# Patient Record
Sex: MALE | Race: WHITE | URBAN - METROPOLITAN AREA
[De-identification: names, ages, dates, MRNs, and addresses within clinical notes are randomized per-mention and may not be internally consistent; named-entity substitution may affect disease eponyms.]

---

## 2021-01-29 ENCOUNTER — IMMUNIZATION (OUTPATIENT)
Dept: LAB | Facility: HOSPITAL | Age: 66
End: 2021-01-29
Attending: EMERGENCY MEDICINE
Payer: MEDICARE

## 2021-01-29 DIAGNOSIS — Z23 NEED FOR VACCINATION: Primary | ICD-10-CM

## 2021-01-29 PROCEDURE — 0011A SARSCOV2 VAC 100MCG/0.5ML IM: CPT

## 2021-02-26 ENCOUNTER — IMMUNIZATION (OUTPATIENT)
Dept: LAB | Facility: HOSPITAL | Age: 66
End: 2021-02-26
Attending: EMERGENCY MEDICINE
Payer: MEDICARE

## 2021-02-26 DIAGNOSIS — Z23 NEED FOR VACCINATION: Primary | ICD-10-CM

## 2021-02-26 PROCEDURE — 0012A SARSCOV2 VAC 100MCG/0.5ML IM: CPT

## 2022-11-03 NOTE — CM/SW NOTE
CM received e-mail from [address] [place] [address] office, pt pre-op dc plan is:  Return home with [facility] on dc in 1-2 days. CM requested department  [facility]) to initiate [address] referral for [facility] on admission. Plan: Home with Home Health-Agency TBD. Brittany Shipman.  Nicolle Goodman RN, BSN  Nurse   [phone]

## 2022-11-09 ENCOUNTER — LAB ENCOUNTER (OUTPATIENT)
Dept: LAB | Facility: HOSPITAL | Age: 67
End: 2022-11-09
Attending: ORTHOPAEDIC SURGERY
Payer: MEDICARE

## 2022-11-09 DIAGNOSIS — Z01.818 PREOPERATIVE TESTING: ICD-10-CM

## 2022-11-09 LAB
ANTIBODY SCREEN: NEGATIVE
RH BLOOD TYPE: NEGATIVE
SARS-COV-2 RNA RESP QL NAA+PROBE: NOT DETECTED

## 2022-11-09 PROCEDURE — 86850 RBC ANTIBODY SCREEN: CPT

## 2022-11-09 PROCEDURE — 36415 COLL VENOUS BLD VENIPUNCTURE: CPT

## 2022-11-09 PROCEDURE — 86901 BLOOD TYPING SEROLOGIC RH(D): CPT

## 2022-11-09 PROCEDURE — 86900 BLOOD TYPING SEROLOGIC ABO: CPT

## 2022-11-10 ENCOUNTER — ANESTHESIA EVENT (OUTPATIENT)
Dept: SURGERY | Facility: HOSPITAL | Age: 67
End: 2022-11-10
Payer: MEDICARE

## 2022-11-10 RX ORDER — FUROSEMIDE 40 MG/1
40 TABLET ORAL EVERY MORNING
COMMUNITY

## 2022-11-10 RX ORDER — CLONIDINE HYDROCHLORIDE 0.1 MG/1
0.5 TABLET ORAL NIGHTLY
COMMUNITY

## 2022-11-10 RX ORDER — ARIPIPRAZOLE 15 MG/1
TABLET ORAL DAILY
COMMUNITY

## 2022-11-10 RX ORDER — ACETAMINOPHEN 500 MG
500 TABLET ORAL EVERY 6 HOURS PRN
COMMUNITY

## 2022-11-10 RX ORDER — ERGOCALCIFEROL (VITAMIN D2) 1250 MCG
CAPSULE ORAL WEEKLY
COMMUNITY

## 2022-11-10 RX ORDER — TRAMADOL HYDROCHLORIDE 50 MG/1
50 TABLET ORAL EVERY 6 HOURS PRN
COMMUNITY

## 2022-11-10 RX ORDER — DOXAZOSIN 8 MG/1
8 TABLET ORAL NIGHTLY
COMMUNITY

## 2022-11-10 RX ORDER — FLECAINIDE ACETATE 100 MG/1
100 TABLET ORAL 2 TIMES DAILY
COMMUNITY

## 2022-11-10 RX ORDER — TRAZODONE HYDROCHLORIDE 50 MG/1
50 TABLET ORAL NIGHTLY
COMMUNITY

## 2022-11-10 RX ORDER — SIMVASTATIN 10 MG
10 TABLET ORAL NIGHTLY
COMMUNITY

## 2022-11-11 ENCOUNTER — HOSPITAL ENCOUNTER (INPATIENT)
Facility: HOSPITAL | Age: 67
LOS: 2 days | Discharge: HOME | End: 2022-11-15
Attending: ORTHOPAEDIC SURGERY | Admitting: ORTHOPAEDIC SURGERY
Payer: MEDICARE

## 2022-11-11 ENCOUNTER — ANESTHESIA (OUTPATIENT)
Dept: SURGERY | Facility: HOSPITAL | Age: 67
End: 2022-11-11
Payer: MEDICARE

## 2022-11-11 ENCOUNTER — APPOINTMENT (OUTPATIENT)
Dept: GENERAL RADIOLOGY | Facility: HOSPITAL | Age: 67
End: 2022-11-11
Attending: NURSE PRACTITIONER
Payer: MEDICARE

## 2022-11-11 ENCOUNTER — HOSPITAL ENCOUNTER (INPATIENT)
Facility: HOSPITAL | Age: 67
LOS: 2 days | Discharge: HOME HEALTH | End: 2022-11-15
Attending: ORTHOPAEDIC SURGERY | Admitting: ORTHOPAEDIC SURGERY
Payer: MEDICARE

## 2022-11-11 DIAGNOSIS — Z01.818 PREOPERATIVE TESTING: Primary | ICD-10-CM

## 2022-11-11 PROBLEM — I10 ESSENTIAL HYPERTENSION: Chronic | Status: ACTIVE | Noted: 2022-11-11

## 2022-11-11 PROBLEM — E78.5 HYPERLIPIDEMIA: Chronic | Status: ACTIVE | Noted: 2022-11-11

## 2022-11-11 PROBLEM — M17.12 PRIMARY OSTEOARTHRITIS OF LEFT KNEE: Status: ACTIVE | Noted: 2022-11-11

## 2022-11-11 PROBLEM — I48.0 PAROXYSMAL ATRIAL FIBRILLATION (HCC): Chronic | Status: ACTIVE | Noted: 2022-11-11

## 2022-11-11 PROBLEM — N40.0 BPH (BENIGN PROSTATIC HYPERPLASIA): Chronic | Status: ACTIVE | Noted: 2022-11-11

## 2022-11-11 LAB — RH BLOOD TYPE: NEGATIVE

## 2022-11-11 PROCEDURE — 0SRD0J9 REPLACEMENT OF LEFT KNEE JOINT WITH SYNTHETIC SUBSTITUTE, CEMENTED, OPEN APPROACH: ICD-10-PCS | Performed by: ORTHOPAEDIC SURGERY

## 2022-11-11 PROCEDURE — 73560 X-RAY EXAM OF KNEE 1 OR 2: CPT | Performed by: NURSE PRACTITIONER

## 2022-11-11 PROCEDURE — 99204 OFFICE O/P NEW MOD 45 MIN: CPT | Performed by: HOSPITALIST

## 2022-11-11 DEVICE — FEMORAL COMPONENT, CR
Type: IMPLANTABLE DEVICE | Site: KNEE | Status: FUNCTIONAL
Brand: LINKSYMPHOKNEE

## 2022-11-11 DEVICE — ARTICULATING SURFACES, CR
Type: IMPLANTABLE DEVICE | Site: KNEE | Status: FUNCTIONAL
Brand: LINKSYMPHOKNEE

## 2022-11-11 DEVICE — BIOMET BC R 1X40 US: Type: IMPLANTABLE DEVICE | Status: FUNCTIONAL

## 2022-11-11 DEVICE — TIBIAL COMPONENT, MONOBLOCK
Type: IMPLANTABLE DEVICE | Site: KNEE | Status: FUNCTIONAL
Brand: LINKSYMPHOKNEE

## 2022-11-11 DEVICE — PATELLA COMPONENT, 3-PEG
Type: IMPLANTABLE DEVICE | Site: KNEE | Status: FUNCTIONAL
Brand: LINKSYMPHOKNEE

## 2022-11-11 RX ORDER — DIPHENHYDRAMINE HCL 25 MG
25 CAPSULE ORAL EVERY 4 HOURS PRN
Status: DISCONTINUED | OUTPATIENT
Start: 2022-11-11 | End: 2022-11-12

## 2022-11-11 RX ORDER — HYDROCODONE BITARTRATE AND ACETAMINOPHEN 5; 325 MG/1; MG/1
1 TABLET ORAL EVERY 4 HOURS PRN
Status: DISCONTINUED | OUTPATIENT
Start: 2022-11-11 | End: 2022-11-15

## 2022-11-11 RX ORDER — SODIUM CHLORIDE, SODIUM LACTATE, POTASSIUM CHLORIDE, CALCIUM CHLORIDE 600; 310; 30; 20 MG/100ML; MG/100ML; MG/100ML; MG/100ML
INJECTION, SOLUTION INTRAVENOUS CONTINUOUS
Status: DISCONTINUED | OUTPATIENT
Start: 2022-11-11 | End: 2022-11-14

## 2022-11-11 RX ORDER — HYDROCODONE BITARTRATE AND ACETAMINOPHEN 7.5; 325 MG/1; MG/1
1 TABLET ORAL EVERY 6 HOURS PRN
Status: DISCONTINUED | OUTPATIENT
Start: 2022-11-11 | End: 2022-11-12

## 2022-11-11 RX ORDER — NALOXONE HYDROCHLORIDE 0.4 MG/ML
0.08 INJECTION, SOLUTION INTRAMUSCULAR; INTRAVENOUS; SUBCUTANEOUS
Status: DISCONTINUED | OUTPATIENT
Start: 2022-11-11 | End: 2022-11-12

## 2022-11-11 RX ORDER — HYDROCODONE BITARTRATE AND ACETAMINOPHEN 10; 325 MG/1; MG/1
TABLET ORAL
COMMUNITY
Start: 2022-10-25

## 2022-11-11 RX ORDER — PRAVASTATIN SODIUM 20 MG
20 TABLET ORAL NIGHTLY
Status: DISCONTINUED | OUTPATIENT
Start: 2022-11-11 | End: 2022-11-15

## 2022-11-11 RX ORDER — NALOXONE HYDROCHLORIDE 0.4 MG/ML
80 INJECTION, SOLUTION INTRAMUSCULAR; INTRAVENOUS; SUBCUTANEOUS AS NEEDED
Status: ACTIVE | OUTPATIENT
Start: 2022-11-11 | End: 2022-11-11

## 2022-11-11 RX ORDER — FUROSEMIDE 40 MG/1
40 TABLET ORAL EVERY MORNING
Status: DISCONTINUED | OUTPATIENT
Start: 2022-11-12 | End: 2022-11-15

## 2022-11-11 RX ORDER — CYCLOBENZAPRINE HCL 10 MG
10 TABLET ORAL EVERY 8 HOURS PRN
Status: DISCONTINUED | OUTPATIENT
Start: 2022-11-11 | End: 2022-11-15

## 2022-11-11 RX ORDER — HYDROCODONE BITARTRATE AND ACETAMINOPHEN 7.5; 325 MG/1; MG/1
2 TABLET ORAL EVERY 6 HOURS PRN
Status: DISCONTINUED | OUTPATIENT
Start: 2022-11-11 | End: 2022-11-12

## 2022-11-11 RX ORDER — BISACODYL 10 MG
10 SUPPOSITORY, RECTAL RECTAL
Status: DISCONTINUED | OUTPATIENT
Start: 2022-11-11 | End: 2022-11-15

## 2022-11-11 RX ORDER — METOCLOPRAMIDE HYDROCHLORIDE 5 MG/ML
10 INJECTION INTRAMUSCULAR; INTRAVENOUS EVERY 8 HOURS PRN
Status: DISCONTINUED | OUTPATIENT
Start: 2022-11-11 | End: 2022-11-14 | Stop reason: ALTCHOICE

## 2022-11-11 RX ORDER — BUPIVACAINE HYDROCHLORIDE 7.5 MG/ML
INJECTION, SOLUTION INTRASPINAL
Status: COMPLETED | OUTPATIENT
Start: 2022-11-11 | End: 2022-11-11

## 2022-11-11 RX ORDER — FAMOTIDINE 20 MG/1
20 TABLET, FILM COATED ORAL ONCE
Status: COMPLETED | OUTPATIENT
Start: 2022-11-11 | End: 2022-11-11

## 2022-11-11 RX ORDER — ARIPIPRAZOLE 15 MG/1
40 TABLET ORAL DAILY
Status: DISCONTINUED | OUTPATIENT
Start: 2022-11-12 | End: 2022-11-14 | Stop reason: ALTCHOICE

## 2022-11-11 RX ORDER — HALOPERIDOL 5 MG/ML
0.5 INJECTION INTRAMUSCULAR ONCE AS NEEDED
Status: ACTIVE | OUTPATIENT
Start: 2022-11-11 | End: 2022-11-11

## 2022-11-11 RX ORDER — DIPHENHYDRAMINE HYDROCHLORIDE 50 MG/ML
25 INJECTION INTRAMUSCULAR; INTRAVENOUS ONCE AS NEEDED
Status: ACTIVE | OUTPATIENT
Start: 2022-11-11 | End: 2022-11-11

## 2022-11-11 RX ORDER — HYDROMORPHONE HYDROCHLORIDE 1 MG/ML
0.5 INJECTION, SOLUTION INTRAMUSCULAR; INTRAVENOUS; SUBCUTANEOUS EVERY 2 HOUR PRN
Status: DISCONTINUED | OUTPATIENT
Start: 2022-11-11 | End: 2022-11-15

## 2022-11-11 RX ORDER — CEFAZOLIN SODIUM/WATER 2 G/20 ML
2 SYRINGE (ML) INTRAVENOUS EVERY 8 HOURS
Status: COMPLETED | OUTPATIENT
Start: 2022-11-11 | End: 2022-11-12

## 2022-11-11 RX ORDER — HYDROMORPHONE HYDROCHLORIDE 1 MG/ML
0.4 INJECTION, SOLUTION INTRAMUSCULAR; INTRAVENOUS; SUBCUTANEOUS EVERY 5 MIN PRN
Status: DISCONTINUED | OUTPATIENT
Start: 2022-11-11 | End: 2022-11-14

## 2022-11-11 RX ORDER — POLYETHYLENE GLYCOL 3350 17 G/17G
17 POWDER, FOR SOLUTION ORAL DAILY PRN
Status: DISCONTINUED | OUTPATIENT
Start: 2022-11-11 | End: 2022-11-15

## 2022-11-11 RX ORDER — SENNOSIDES 8.6 MG
17.2 TABLET ORAL NIGHTLY
Status: DISCONTINUED | OUTPATIENT
Start: 2022-11-11 | End: 2022-11-15

## 2022-11-11 RX ORDER — MORPHINE SULFATE 4 MG/ML
4 INJECTION, SOLUTION INTRAMUSCULAR; INTRAVENOUS EVERY 10 MIN PRN
Status: DISCONTINUED | OUTPATIENT
Start: 2022-11-11 | End: 2022-11-14

## 2022-11-11 RX ORDER — DIPHENHYDRAMINE HYDROCHLORIDE 50 MG/ML
12.5 INJECTION INTRAMUSCULAR; INTRAVENOUS EVERY 4 HOURS PRN
Status: DISCONTINUED | OUTPATIENT
Start: 2022-11-11 | End: 2022-11-12

## 2022-11-11 RX ORDER — BUPIVACAINE HYDROCHLORIDE 2.5 MG/ML
INJECTION, SOLUTION EPIDURAL; INFILTRATION; INTRACAUDAL AS NEEDED
Status: DISCONTINUED | OUTPATIENT
Start: 2022-11-11 | End: 2022-11-11 | Stop reason: HOSPADM

## 2022-11-11 RX ORDER — MORPHINE SULFATE 10 MG/ML
6 INJECTION, SOLUTION INTRAMUSCULAR; INTRAVENOUS EVERY 10 MIN PRN
Status: DISCONTINUED | OUTPATIENT
Start: 2022-11-11 | End: 2022-11-14

## 2022-11-11 RX ORDER — MORPHINE SULFATE 4 MG/ML
2 INJECTION, SOLUTION INTRAMUSCULAR; INTRAVENOUS EVERY 10 MIN PRN
Status: DISCONTINUED | OUTPATIENT
Start: 2022-11-11 | End: 2022-11-14

## 2022-11-11 RX ORDER — CEFAZOLIN SODIUM/WATER 2 G/20 ML
2 SYRINGE (ML) INTRAVENOUS ONCE
Status: COMPLETED | OUTPATIENT
Start: 2022-11-11 | End: 2022-11-11

## 2022-11-11 RX ORDER — FAMOTIDINE 10 MG/ML
20 INJECTION, SOLUTION INTRAVENOUS 2 TIMES DAILY
Status: DISCONTINUED | OUTPATIENT
Start: 2022-11-11 | End: 2022-11-15

## 2022-11-11 RX ORDER — ACETAMINOPHEN 325 MG/1
650 TABLET ORAL EVERY 6 HOURS PRN
Status: DISCONTINUED | OUTPATIENT
Start: 2022-11-11 | End: 2022-11-12

## 2022-11-11 RX ORDER — DIPHENHYDRAMINE HCL 25 MG
25 CAPSULE ORAL EVERY 4 HOURS PRN
Status: DISCONTINUED | OUTPATIENT
Start: 2022-11-11 | End: 2022-11-15

## 2022-11-11 RX ORDER — TRAZODONE HYDROCHLORIDE 50 MG/1
50 TABLET ORAL NIGHTLY
Status: DISCONTINUED | OUTPATIENT
Start: 2022-11-11 | End: 2022-11-15

## 2022-11-11 RX ORDER — ONDANSETRON 2 MG/ML
4 INJECTION INTRAMUSCULAR; INTRAVENOUS ONCE AS NEEDED
Status: ACTIVE | OUTPATIENT
Start: 2022-11-11 | End: 2022-11-11

## 2022-11-11 RX ORDER — METOCLOPRAMIDE HYDROCHLORIDE 5 MG/ML
10 INJECTION INTRAMUSCULAR; INTRAVENOUS EVERY 8 HOURS PRN
Status: DISCONTINUED | OUTPATIENT
Start: 2022-11-11 | End: 2022-11-15

## 2022-11-11 RX ORDER — DIPHENHYDRAMINE HYDROCHLORIDE 50 MG/ML
12.5 INJECTION INTRAMUSCULAR; INTRAVENOUS EVERY 4 HOURS PRN
Status: DISCONTINUED | OUTPATIENT
Start: 2022-11-11 | End: 2022-11-15

## 2022-11-11 RX ORDER — SODIUM PHOSPHATE, DIBASIC AND SODIUM PHOSPHATE, MONOBASIC 7; 19 G/133ML; G/133ML
1 ENEMA RECTAL ONCE AS NEEDED
Status: DISCONTINUED | OUTPATIENT
Start: 2022-11-11 | End: 2022-11-15

## 2022-11-11 RX ORDER — NALBUPHINE HCL 10 MG/ML
2.5 AMPUL (ML) INJECTION EVERY 4 HOURS PRN
Status: DISCONTINUED | OUTPATIENT
Start: 2022-11-11 | End: 2022-11-12

## 2022-11-11 RX ORDER — PROCHLORPERAZINE EDISYLATE 5 MG/ML
5 INJECTION INTRAMUSCULAR; INTRAVENOUS ONCE AS NEEDED
Status: ACTIVE | OUTPATIENT
Start: 2022-11-11 | End: 2022-11-11

## 2022-11-11 RX ORDER — OXYCODONE HCL 10 MG/1
10 TABLET, FILM COATED, EXTENDED RELEASE ORAL ONCE
Status: COMPLETED | OUTPATIENT
Start: 2022-11-11 | End: 2022-11-11

## 2022-11-11 RX ORDER — TERAZOSIN 10 MG/1
10 CAPSULE ORAL NIGHTLY
Status: DISCONTINUED | OUTPATIENT
Start: 2022-11-11 | End: 2022-11-15

## 2022-11-11 RX ORDER — HYDROCODONE BITARTRATE AND ACETAMINOPHEN 10; 325 MG/1; MG/1
2 TABLET ORAL EVERY 4 HOURS PRN
Status: DISCONTINUED | OUTPATIENT
Start: 2022-11-11 | End: 2022-11-15

## 2022-11-11 RX ORDER — METOPROLOL TARTRATE 5 MG/5ML
2.5 INJECTION INTRAVENOUS ONCE
Status: DISCONTINUED | OUTPATIENT
Start: 2022-11-11 | End: 2022-11-11

## 2022-11-11 RX ORDER — HYDROMORPHONE HYDROCHLORIDE 1 MG/ML
0.4 INJECTION, SOLUTION INTRAMUSCULAR; INTRAVENOUS; SUBCUTANEOUS
Status: DISCONTINUED | OUTPATIENT
Start: 2022-11-11 | End: 2022-11-12

## 2022-11-11 RX ORDER — ONDANSETRON 2 MG/ML
4 INJECTION INTRAMUSCULAR; INTRAVENOUS EVERY 6 HOURS PRN
Status: DISCONTINUED | OUTPATIENT
Start: 2022-11-11 | End: 2022-11-14 | Stop reason: ALTCHOICE

## 2022-11-11 RX ORDER — TRAMADOL HYDROCHLORIDE 50 MG/1
50 TABLET ORAL EVERY 6 HOURS PRN
Status: DISCONTINUED | OUTPATIENT
Start: 2022-11-11 | End: 2022-11-15

## 2022-11-11 RX ORDER — FAMOTIDINE 20 MG/1
20 TABLET, FILM COATED ORAL 2 TIMES DAILY
Status: DISCONTINUED | OUTPATIENT
Start: 2022-11-11 | End: 2022-11-15

## 2022-11-11 RX ORDER — ACETAMINOPHEN 500 MG
1000 TABLET ORAL ONCE
Status: COMPLETED | OUTPATIENT
Start: 2022-11-11 | End: 2022-11-11

## 2022-11-11 RX ORDER — TRANEXAMIC ACID 650 MG/1
1300 TABLET ORAL ONCE
Status: COMPLETED | OUTPATIENT
Start: 2022-11-11 | End: 2022-11-11

## 2022-11-11 RX ORDER — ACETAMINOPHEN 500 MG
1000 TABLET ORAL EVERY 8 HOURS PRN
Status: DISCONTINUED | OUTPATIENT
Start: 2022-11-11 | End: 2022-11-15

## 2022-11-11 RX ORDER — HYDROMORPHONE HYDROCHLORIDE 1 MG/ML
0.6 INJECTION, SOLUTION INTRAMUSCULAR; INTRAVENOUS; SUBCUTANEOUS
Status: DISCONTINUED | OUTPATIENT
Start: 2022-11-11 | End: 2022-11-12

## 2022-11-11 RX ORDER — HYDROMORPHONE HYDROCHLORIDE 1 MG/ML
0.2 INJECTION, SOLUTION INTRAMUSCULAR; INTRAVENOUS; SUBCUTANEOUS EVERY 5 MIN PRN
Status: DISCONTINUED | OUTPATIENT
Start: 2022-11-11 | End: 2022-11-14

## 2022-11-11 RX ORDER — ONDANSETRON 2 MG/ML
4 INJECTION INTRAMUSCULAR; INTRAVENOUS EVERY 6 HOURS PRN
Status: DISCONTINUED | OUTPATIENT
Start: 2022-11-11 | End: 2022-11-15

## 2022-11-11 RX ORDER — FLECAINIDE ACETATE 100 MG/1
100 TABLET ORAL 2 TIMES DAILY
Status: DISCONTINUED | OUTPATIENT
Start: 2022-11-11 | End: 2022-11-15

## 2022-11-11 RX ORDER — MORPHINE SULFATE 1 MG/ML
INJECTION, SOLUTION EPIDURAL; INTRATHECAL; INTRAVENOUS
Status: COMPLETED | OUTPATIENT
Start: 2022-11-11 | End: 2022-11-11

## 2022-11-11 RX ORDER — CELECOXIB 200 MG/1
200 CAPSULE ORAL ONCE
Status: COMPLETED | OUTPATIENT
Start: 2022-11-11 | End: 2022-11-11

## 2022-11-11 RX ORDER — DOCUSATE SODIUM 100 MG/1
100 CAPSULE, LIQUID FILLED ORAL 2 TIMES DAILY
Status: DISCONTINUED | OUTPATIENT
Start: 2022-11-11 | End: 2022-11-15

## 2022-11-11 RX ORDER — METOCLOPRAMIDE 10 MG/1
10 TABLET ORAL ONCE
Status: DISCONTINUED | OUTPATIENT
Start: 2022-11-11 | End: 2022-11-11 | Stop reason: HOSPADM

## 2022-11-11 RX ORDER — HYDROMORPHONE HYDROCHLORIDE 1 MG/ML
0.6 INJECTION, SOLUTION INTRAMUSCULAR; INTRAVENOUS; SUBCUTANEOUS EVERY 5 MIN PRN
Status: DISCONTINUED | OUTPATIENT
Start: 2022-11-11 | End: 2022-11-14

## 2022-11-11 RX ORDER — KETOROLAC TROMETHAMINE 15 MG/ML
15 INJECTION, SOLUTION INTRAMUSCULAR; INTRAVENOUS ONCE
Status: COMPLETED | OUTPATIENT
Start: 2022-11-11 | End: 2022-11-11

## 2022-11-11 RX ORDER — HYDROCODONE BITARTRATE AND ACETAMINOPHEN 10; 325 MG/1; MG/1
1 TABLET ORAL EVERY 4 HOURS PRN
Status: DISCONTINUED | OUTPATIENT
Start: 2022-11-11 | End: 2022-11-15

## 2022-11-11 RX ADMIN — SODIUM CHLORIDE, SODIUM LACTATE, POTASSIUM CHLORIDE, CALCIUM CHLORIDE: 600; 310; 30; 20 INJECTION, SOLUTION INTRAVENOUS at 09:32:00

## 2022-11-11 RX ADMIN — BUPIVACAINE HYDROCHLORIDE 1.6 ML: 7.5 INJECTION, SOLUTION INTRASPINAL at 08:15:00

## 2022-11-11 RX ADMIN — SODIUM CHLORIDE, SODIUM LACTATE, POTASSIUM CHLORIDE, CALCIUM CHLORIDE: 600; 310; 30; 20 INJECTION, SOLUTION INTRAVENOUS at 10:06:00

## 2022-11-11 RX ADMIN — MORPHINE SULFATE 0.3 MG: 1 INJECTION, SOLUTION EPIDURAL; INTRATHECAL; INTRAVENOUS at 08:15:00

## 2022-11-11 RX ADMIN — SODIUM CHLORIDE, SODIUM LACTATE, POTASSIUM CHLORIDE, CALCIUM CHLORIDE: 600; 310; 30; 20 INJECTION, SOLUTION INTRAVENOUS at 08:11:00

## 2022-11-11 RX ADMIN — CEFAZOLIN SODIUM/WATER 2 G: 2 G/20 ML SYRINGE (ML) INTRAVENOUS at 08:24:00

## 2022-11-11 NOTE — CM/SW NOTE
11/11/22 1300   CM/SW Referral Data   Referral Source Physician   Reason for Referral Discharge planning   Informant Patient;Spouse/Significant Other   Pertinent Medical Hx   Does patient have an established PCP? No   Significant Past Medical/Mental Health Hx Lt total knee- [place]   Patient Info   Patient's Current Mental Status at Time of Assessment Alert;Oriented   Number of Levels in Home 2   Number of Stair in Home 7 in   Patient lives with Spouse/Significant other   Patient Status Prior to Admission   Independent with ADLs and Mobility Yes   Discharge Needs   Anticipated D/C needs Home health care   Choice of Post-Acute Provider   Informed patient of right to choose their preferred provider Yes   List of appropriate post-acute services provided to patient/family with quality data Yes   Patient/family choice [facility]     Cm received MDO for dc planning post surgery. Met with pt and spouse at bedside. Discussed pre-op dc plan for home with [facility]. Pt agrees to this plan. [facility] choice list provided to pt. Pt has chosen [facility]. F2F entered. Plan: Home with [facility] when stable. Pedro. Carlos A Martinez 82 [phone]    / to remain available for support and/or discharge planning. Beverley Grant.  Bayron Gibbs RN, BSN  Nurse   [phone]

## 2022-11-11 NOTE — CM/SW NOTE
Department  notified of request for [facility] referrals started. Assigned CM/SW to follow up with pt/family on further discharge planning.      Eliel Guerra   November 11, 2022   11:13

## 2022-11-11 NOTE — ANESTHESIA PROCEDURE NOTES
Spinal Block    Date/Time: 11/11/2022 8:15 AM  Performed by: Azul Atwood MD  Authorized by: Azul Atwood MD       General Information and Staff    Start Time:  11/11/2022 8:15 AM  End Time:  11/11/2022 8:23 AM  Anesthesiologist:  Azul Atwood MD  Performed by:   Anesthesiologist  Patient Location:  OR  Site identification: surface landmarks    Preanesthetic Checklist: patient identified, IV checked, risks and benefits discussed, monitors and equipment checked, pre-op evaluation, timeout performed, anesthesia consent and sterile technique used      Procedure Details    Patient Position:  Sitting  Prep: ChloraPrep    Monitoring:  Cardiac monitor  Approach:  Midline  Location:  L2-3  Injection Technique:  Single-shot    Needle    Needle Type:  Pencil-tip  Needle Gauge:  24 G  Needle Length:  3.5 in    Assessment    Sensory Level:  T10  Events: clear CSF, CSF aspirated, well tolerated and blood negative      Additional Comments

## 2022-11-11 NOTE — OPERATIVE REPORT
[facility]    TKA Brief Operative Note    Clemencia Gowers. Patient Status:  Outpatient in a Bed    1955 MRN [id number]   Location [facility] Attending Brendan Healy MD     PCP No primary care provider on file. Preop DX: left knee degenerative joint disease   Postop DX: left knee degenerative joint disease  Procedure:  left total knee arthroplasty  Surgeon: Ana Maria Cleary MD  Assistants:  Rigoberto Pruett; Nikolay Kwan  Anesthesia: Spinal Anesthesia  EBL: 50 mL  Tourniquet Time: 75 minutes  Fluids: 1000 mL  Specimen: Bone left knee  Findings: DJD  left knee  Drain: None  Implants: Link LSK   Complications: none  All needle and sponge counts correct prior to leaving the operating room. All assistants were a medical necessity to complete this procedure. Plan: Transfer to recovery room in stable condition. Transition to floor when stable. I was present and scrubbed for the entire procedure.     Suly Garcia MD  [phone] (c)  [phone] (o)  2022

## 2022-11-11 NOTE — PLAN OF CARE
Pt A&O x4. General diet. Pain medication as needed. RA. SCDS. Quiroz. Walking 1 with a walker. Patient has remained free from falls throughout the day. Hourly rounding maintained. Pt's bed in lowest position w/ side rails up. Patient has been educated and is compliant with jeremi light system. The patient has been frequently assessed  and evaluated pursuant to at risk medications. Pt's room tidy and clear from potential fall risk.     Problem: Patient Centered Care  Goal: Patient preferences are identified and integrated in the patient's plan of care  Description: Interventions:  - What would you like us to know as we care for you?   - Provide timely, complete, and accurate information to patient/family  - Incorporate patient and family knowledge, values, beliefs, and cultural backgrounds into the planning and delivery of care  - Encourage patient/family to participate in care and decision-making at the level they choose  - Honor patient and family perspectives and choices  Outcome: Progressing     Problem: Patient/Family Goals  Goal: Patient/Family Long Term Goal  Description: Patient's Long Term Goal:     Interventions:  -   - See additional Care Plan goals for specific interventions  Outcome: Progressing  Goal: Patient/Family Short Term Goal  Description: Patient's Short Term Goal:     Interventions:   -   - See additional Care Plan goals for specific interventions  Outcome: Progressing     Problem: PAIN - ADULT  Goal: Verbalizes/displays adequate comfort level or patient's stated pain goal  Description: INTERVENTIONS:  - Encourage pt to monitor pain and request assistance  - Assess pain using appropriate pain scale  - Administer analgesics based on type and severity of pain and evaluate response  - Implement non-pharmacological measures as appropriate and evaluate response  - Consider cultural and social influences on pain and pain management  - Manage/alleviate anxiety  - Utilize distraction and/or relaxation techniques  - Monitor for opioid side effects  - Notify MD/LIP if interventions unsuccessful or patient reports new pain  - Anticipate increased pain with activity and pre-medicate as appropriate  Outcome: Progressing     Problem: RISK FOR INFECTION - ADULT  Goal: Absence of fever/infection during anticipated neutropenic period  Description: INTERVENTIONS  - Monitor WBC  - Administer growth factors as ordered  - Implement neutropenic guidelines  Outcome: Progressing     Problem: SAFETY ADULT - FALL  Goal: Free from fall injury  Description: INTERVENTIONS:  - Assess pt frequently for physical needs  - Identify cognitive and physical deficits and behaviors that affect risk of falls.   - [place] fall precautions as indicated by assessment.  - Educate pt/family on patient safety including physical limitations  - Instruct pt to call for assistance with activity based on assessment  - Modify environment to reduce risk of injury  - Provide assistive devices as appropriate  - Consider OT/PT consult to assist with strengthening/mobility  - Encourage toileting schedule  Outcome: Progressing

## 2022-11-12 LAB
ANION GAP SERPL CALC-SCNC: 6 MMOL/L (ref 0–18)
BUN BLD-MCNC: 25 MG/DL (ref 7–18)
BUN/CREAT SERPL: 25.3 (ref 10–20)
CALCIUM BLD-MCNC: 8.1 MG/DL (ref 8.5–10.1)
CHLORIDE SERPL-SCNC: 107 MMOL/L (ref 98–112)
CO2 SERPL-SCNC: 29 MMOL/L (ref 21–32)
CREAT BLD-MCNC: 0.99 MG/DL
DEPRECATED RDW RBC AUTO: 41.8 FL (ref 35.1–46.3)
ERYTHROCYTE [DISTWIDTH] IN BLOOD BY AUTOMATED COUNT: 12.1 % (ref 11–15)
GFR SERPLBLD BASED ON 1.73 SQ M-ARVRAT: 84 ML/MIN/1.73M2 (ref 60–?)
GLUCOSE BLD-MCNC: 116 MG/DL (ref 70–99)
HCT VFR BLD AUTO: 37.7 %
HGB BLD-MCNC: 11.9 G/DL
MCH RBC QN AUTO: 29.7 PG (ref 26–34)
MCHC RBC AUTO-ENTMCNC: 31.6 G/DL (ref 31–37)
MCV RBC AUTO: 94 FL
OSMOLALITY SERPL CALC.SUM OF ELEC: 299 MOSM/KG (ref 275–295)
PLATELET # BLD AUTO: 146 10(3)UL (ref 150–450)
POTASSIUM SERPL-SCNC: 4.2 MMOL/L (ref 3.5–5.1)
RBC # BLD AUTO: 4.01 X10(6)UL
SODIUM SERPL-SCNC: 142 MMOL/L (ref 136–145)
WBC # BLD AUTO: 8.8 X10(3) UL (ref 4–11)

## 2022-11-12 PROCEDURE — 99233 SBSQ HOSP IP/OBS HIGH 50: CPT | Performed by: HOSPITALIST

## 2022-11-12 NOTE — PHYSICAL THERAPY NOTE
Patient was see BID today. second session forcus on gait training including stairs navigation. Patient amb with inc steady gait, improved pattern with cues d/t limited heel strike and lt knee ext in standing face, 100 ft x 2 CGA for balance. 4 stairs navigate with 2 HR with min A and 4 stairs with 1 HR and SC with Mod A with cues for safety. Patient perspired with slightly SOB, no c/o. Patient return to room via wheelchair. Pt's male friend present to take patient home and assist on stairs to get to pt house. Patient wants to go home today. Instructed on safety with mobility with break between. RW was issued and instruction on safety with mobility given. RN aware.

## 2022-11-12 NOTE — DISCHARGE INSTRUCTIONS
The patient will call for an appointment in the next 2 weeks for follow-up. The patient has been instructed on wound care and may shower if wound is clean and dry. If the wound has drainage then dry dressing changes should be performed daily until the wound is dry. The patient has been instructed to contact the office if increasing drainage, redness, or swelling to the operative wound/extremity occurs. Similarly, if they develop fevers and chills they should call the office ASAP. The patient will continue to wear ADRIANNA hose to both legs for 3 weeks. They may remove them at night or if they are causing skin irritation. Prescribed DVT prophylaxis should be taken for 2-3 weeks after discharge (as written on prescription). Oral pain medications have been provided and instruction on administration given to the patient. If there are any questions or increasing or uncontrolled pain, the patient should call the office [phone]. The patient will resume a normal diet. They should anticipate a slight decrease in appetite after surgery, but should notify the office if there are any problems with nausea, vomiting, constipation or diarrhea. A stool softner has been ordered and should be taken regularly while on pain medications. Sometimes managing your health at home requires assistance. The [facility] team has recognized your preference to use [facility]. They can be reached by phone at [phone]. The fax number for your reference is [phone]. A representative from the home health agency will contact you or your family to schedule your first visit.    raj

## 2022-11-12 NOTE — PLAN OF CARE
Patient unable to void and bladder scan >500, straight cath per MD order and plan is staying overnight. Post-op day #1 L TKA. Dressing in place to L knee, changed by service this morning, became saturated after working with therapy, service aware and dressing changed, ok to change as needed. Was hypotensive this morning, IV fluids restarted and pressures have improved. Receiving IV fluids per MD order. Tolerating diet. Has only voided once since baxter removal, IV bolus infusing now per MD order. SCDs and eliquis for DVT prophylaxis. Norco Pain medication provided as needed. Up with standby assist and a walker. Encouraged frequent ambulation and use of incentive spirometer. Fall precautions maintained- bed alarm on, bed locked in lowest position, call light and personal belongings within reach, non-skid socks in place to bilateral feet. Frequent rounding by nursing staff. Plan is home today if patient able to void, otherwise home tomorrow.      Problem: Patient Centered Care  Goal: Patient preferences are identified and integrated in the patient's plan of care  Description: Interventions:  - What would you like us to know as we care for you?   - Provide timely, complete, and accurate information to patient/family  - Incorporate patient and family knowledge, values, beliefs, and cultural backgrounds into the planning and delivery of care  - Encourage patient/family to participate in care and decision-making at the level they choose  - Honor patient and family perspectives and choices  Outcome: Progressing     Problem: PAIN - ADULT  Goal: Verbalizes/displays adequate comfort level or patient's stated pain goal  Description: INTERVENTIONS:  - Encourage pt to monitor pain and request assistance  - Assess pain using appropriate pain scale  - Administer analgesics based on type and severity of pain and evaluate response  - Implement non-pharmacological measures as appropriate and evaluate response  - Consider cultural and social influences on pain and pain management  - Manage/alleviate anxiety  - Utilize distraction and/or relaxation techniques  - Monitor for opioid side effects  - Notify MD/LIP if interventions unsuccessful or patient reports new pain  - Anticipate increased pain with activity and pre-medicate as appropriate  Outcome: Progressing     Problem: RISK FOR INFECTION - ADULT  Goal: Absence of fever/infection during anticipated neutropenic period  Description: INTERVENTIONS  - Monitor WBC  - Administer growth factors as ordered  - Implement neutropenic guidelines  Outcome: Progressing     Problem: SAFETY ADULT - FALL  Goal: Free from fall injury  Description: INTERVENTIONS:  - Assess pt frequently for physical needs  - Identify cognitive and physical deficits and behaviors that affect risk of falls.   - [place] fall precautions as indicated by assessment.  - Educate pt/family on patient safety including physical limitations  - Instruct pt to call for assistance with activity based on assessment  - Modify environment to reduce risk of injury  - Provide assistive devices as appropriate  - Consider OT/PT consult to assist with strengthening/mobility  - Encourage toileting schedule  Outcome: Progressing     Problem: DISCHARGE PLANNING  Goal: Discharge to home or other facility with appropriate resources  Description: INTERVENTIONS:  - Identify barriers to discharge w/pt and caregiver  - Include patient/family/discharge partner in discharge planning  - Arrange for needed discharge resources and transportation as appropriate  - Identify discharge learning needs (meds, wound care, etc)  - Arrange for interpreters to assist at discharge as needed  - Consider post-discharge preferences of patient/family/discharge partner  - Complete POLST form as appropriate  - Assess patient's ability to be responsible for managing their own health  - Refer to Case Management Department for coordinating discharge planning if the patient needs post-hospital services based on physician/LIP order or complex needs related to functional status, cognitive ability or social support system  Outcome: Progressing

## 2022-11-12 NOTE — PHYSICAL THERAPY NOTE
PHYSICAL THERAPY TREATMENT NOTE - INPATIENT     Room Number: 406/406-A       Presenting Problem: primary osteoarthritis of left knee; s/p L TKA    Problem List  Principal Problem:    Primary osteoarthritis of left knee  Active Problems:    Paroxysmal atrial fibrillation (HCC)    Essential hypertension    Hyperlipidemia    BPH (benign prostatic hyperplasia)      PHYSICAL THERAPY ASSESSMENT   Chart reviewed. RN Misbah Adams approved participation in physical therapy. PPE worn by therapist: mask and gloves. Patient was wearing a mask during session. Patient presented in bed with 4/10 pain. Patient with good  progress towards goals during this session. Education provided on Total knee exercise protocol, Physical therapy plan of care, physiological benefits of out of bed mobility and proepr breathging at rest and with activity d/t dec sat noted on RW. Jose C Reveal Patient with good carryover. Patient HR running from 40' to 150' during session and RN aware of it. Patient has history of A-fib. Patient limited with his mobility today d/t HR lower BP this morning. Bed mobility: Contact guard assist  Transfers: Contact guard assist  Gait Assistance: Contact guard assist  Distance (ft): 60  Assistive Device: Rolling walker  Pattern: L Decreased stance time; Shuffle      Patient was left in bedside chair at end of session with all needs in reach. The patient's Approx Degree of Impairment: 46.58% has been calculated based on documentation in the [facility] '6 clicks' Inpatient Basic Mobility Short Form. Research supports that patients with this level of impairment may benefit from Home with home health PT. RN aware of patient status post session. DISCHARGE RECOMMENDATIONS  PT Discharge Recommendations: Home with home health PT;24 hour care/supervision     PLAN  PT Treatment Plan: Patient education; Coordination; Body mechanics;Gait training;Stair training;Transfer training    SUBJECTIVE  I feel better with my pain today. OBJECTIVE  Precautions: None    WEIGHT BEARING RESTRICTION  Weight Bearing Restriction: L lower extremity           L Lower Extremity: Weight Bearing as Tolerated    PAIN ASSESSMENT   Rating: 3  Location: left knee  Management Techniques: Activity promotion; Body mechanics;Repositioning    BALANCE                                                                                                                       Static Sitting: Good  Dynamic Sitting: Good           Static Standing: Fair  Dynamic Standing: Fair    ACTIVITY TOLERANCE  Pulse: 78  Heart Rate Source: Monitor  Resp: 20  BP: (!) 89/61  BP Location: Right arm  BP Method: Automatic  Patient Position: Sitting    O2 WALK  Oxygen Therapy  SPO2% on Room Air at Rest: 91  SPO2% Ambulation on Room Air: 96    AM-PAC '6-Clicks' INPATIENT SHORT FORM - BASIC MOBILITY  How much difficulty does the patient currently have. .. Patient Difficulty: Turning over in bed (including adjusting bedclothes, sheets and blankets)?: A Little   Patient Difficulty: Sitting down on and standing up from a chair with arms (e.g., wheelchair, bedside commode, etc.): A Little   Patient Difficulty: Moving from lying on back to sitting on the side of the bed?: A Little   How much help from another person does the patient currently need. .. Help from Another: Moving to and from a bed to a chair (including a wheelchair)?: A Little   Help from Another: Need to walk in hospital room?: A Little   Help from Another: Climbing 3-5 steps with a railing?: A Little     AM-PAC Score:  Raw Score: 18   Approx Degree of Impairment: 46.58%   Standardized Score (AM-PAC Scale): 43.63   CMS Modifier (G-Code): CK      Additional information:     THERAPEUTIC EXERCISES  Lower Extremity Alternating marching  Ankle pumps  Glut sets  Heel slides  Knee extension  Leg slides  Quad sets  SLR     Position Sitting and Supine       Patient End of Session: Up in chair; All patient questions and concerns addressed;Call light within reach;RN aware of session/findings; Needs met    CURRENT GOALS     Goals to be met by: 11/18/22  Patient Goal Patient's self-stated goal is: go home   Goal #1 Patient is able to demonstrate supine - sit EOB @ level: modified independent     Goal #1   Current Status CGA   Goal #2 Patient is able to demonstrate transfers Sit to/from Stand at assistance level: modified independent     Goal #2  Current Status CGA   Goal #3 Patient is able to ambulate 300 feet with assistive device at assistance level: modified independent    Goal #3   Current Status 60 ft RW Min A/CGA   Goal #4 Patient will negotiate 15 stairs/one curb w/ assistive device and supervision   Goal #4   Current Status NT   Goal #5  AROM 0 degrees extension to 95 degrees flexion L knee    Goal #5   Current Status In progress   Goal #6 Patient independently performs home exercise program for ROM/strengthening per the instructions provided in preparation for discharge.    Goal #6  Current Status In progress

## 2022-11-12 NOTE — PLAN OF CARE
Post-op day #1. Dressing in place to left knee. Up with one assist using walker. Vital signs monitored closely. Patient 87% on RA and applied O2 at 3L/min per nasal cannula. Able to tolerate at O2 at 2L/min and SPO2 at 95%. Zofran given for mild nausea with relief. Snack given and tolerated. Quiroz catheter removed per protocol. SCDs and Eliquis for DVT prophylaxis. Norco and tramadol given for pain as needed. Encouraged frequent use of incentive spirometer and patient agree with plan. Fall precautions maintained. Call light within reach. Frequent rounding by nursing staff. Plan to participate in OT/PT and discharge to home once medically cleared. Problem: Patient Centered Care  Goal: Patient preferences are identified and integrated in the patient's plan of care  Description: Interventions:  - What would you like us to know as we care for you?  I take tramadol at home but not that often  - Provide timely, complete, and accurate information to patient/family  - Incorporate patient and family knowledge, values, beliefs, and cultural backgrounds into the planning and delivery of care  - Encourage patient/family to participate in care and decision-making at the level they choose  - Honor patient and family perspectives and choices  Outcome: Progressing     Problem: PAIN - ADULT  Goal: Verbalizes/displays adequate comfort level or patient's stated pain goal  Description: INTERVENTIONS:  - Encourage pt to monitor pain and request assistance  - Assess pain using appropriate pain scale  - Administer analgesics based on type and severity of pain and evaluate response  - Implement non-pharmacological measures as appropriate and evaluate response  - Consider cultural and social influences on pain and pain management  - Manage/alleviate anxiety  - Utilize distraction and/or relaxation techniques  - Monitor for opioid side effects  - Notify MD/LIP if interventions unsuccessful or patient reports new pain  - Anticipate increased pain with activity and pre-medicate as appropriate  Outcome: Progressing     Problem: RISK FOR INFECTION - ADULT  Goal: Absence of fever/infection during anticipated neutropenic period  Description: INTERVENTIONS  - Monitor WBC  - Administer growth factors as ordered  - Implement neutropenic guidelines  Outcome: Progressing     Problem: SAFETY ADULT - FALL  Goal: Free from fall injury  Description: INTERVENTIONS:  - Assess pt frequently for physical needs  - Identify cognitive and physical deficits and behaviors that affect risk of falls.   - [place] fall precautions as indicated by assessment.  - Educate pt/family on patient safety including physical limitations  - Instruct pt to call for assistance with activity based on assessment  - Modify environment to reduce risk of injury  - Provide assistive devices as appropriate  - Consider OT/PT consult to assist with strengthening/mobility  - Encourage toileting schedule  Outcome: Progressing     Problem: DISCHARGE PLANNING  Goal: Discharge to home or other facility with appropriate resources  Description: INTERVENTIONS:  - Identify barriers to discharge w/pt and caregiver  - Include patient/family/discharge partner in discharge planning  - Arrange for needed discharge resources and transportation as appropriate  - Identify discharge learning needs (meds, wound care, etc)  - Arrange for interpreters to assist at discharge as needed  - Consider post-discharge preferences of patient/family/discharge partner  - Complete POLST form as appropriate  - Assess patient's ability to be responsible for managing their own health  - Refer to Case Management Department for coordinating discharge planning if the patient needs post-hospital services based on physician/LIP order or complex needs related to functional status, cognitive ability or social support system  Outcome: Progressing

## 2022-11-13 PROBLEM — Z01.818 PREOPERATIVE TESTING: Status: ACTIVE | Noted: 2022-11-13

## 2022-11-13 LAB
ANION GAP SERPL CALC-SCNC: 3 MMOL/L (ref 0–18)
BUN BLD-MCNC: 17 MG/DL (ref 7–18)
BUN/CREAT SERPL: 18.9 (ref 10–20)
CALCIUM BLD-MCNC: 8 MG/DL (ref 8.5–10.1)
CHLORIDE SERPL-SCNC: 108 MMOL/L (ref 98–112)
CO2 SERPL-SCNC: 29 MMOL/L (ref 21–32)
CREAT BLD-MCNC: 0.9 MG/DL
DEPRECATED RDW RBC AUTO: 41.6 FL (ref 35.1–46.3)
ERYTHROCYTE [DISTWIDTH] IN BLOOD BY AUTOMATED COUNT: 12 % (ref 11–15)
GFR SERPLBLD BASED ON 1.73 SQ M-ARVRAT: 94 ML/MIN/1.73M2 (ref 60–?)
GLUCOSE BLD-MCNC: 116 MG/DL (ref 70–99)
HCT VFR BLD AUTO: 35.1 %
HGB BLD-MCNC: 11.4 G/DL
MCH RBC QN AUTO: 30.4 PG (ref 26–34)
MCHC RBC AUTO-ENTMCNC: 32.5 G/DL (ref 31–37)
MCV RBC AUTO: 93.6 FL
OSMOLALITY SERPL CALC.SUM OF ELEC: 293 MOSM/KG (ref 275–295)
PLATELET # BLD AUTO: 118 10(3)UL (ref 150–450)
POTASSIUM SERPL-SCNC: 4 MMOL/L (ref 3.5–5.1)
RBC # BLD AUTO: 3.75 X10(6)UL
SODIUM SERPL-SCNC: 140 MMOL/L (ref 136–145)
WBC # BLD AUTO: 9.1 X10(3) UL (ref 4–11)

## 2022-11-13 PROCEDURE — 99233 SBSQ HOSP IP/OBS HIGH 50: CPT | Performed by: HOSPITALIST

## 2022-11-13 NOTE — PHYSICAL THERAPY NOTE
PHYSICAL THERAPY TREATMENT NOTE - INPATIENT     Room Number: 406/406-A       Presenting Problem: primary osteoarthritis of left knee; s/p L TKA    Problem List  Principal Problem:    Primary osteoarthritis of left knee  Active Problems:    Paroxysmal atrial fibrillation (HCC)    Essential hypertension    Hyperlipidemia    BPH (benign prostatic hyperplasia)      PHYSICAL THERAPY ASSESSMENT   Chart reviewed. RN Bjorn Marquez approved participation in physical therapy. PPE worn by therapist: mask and gloves. Patient was wearing a mask during session. Patient presented in bed with 4/10 pain. Patient with good  progress towards goals during this session. Education provided on Total knee exercise protocol, Physical therapy plan of care and physiological benefits of out of bed mobility. Patient with good carryover. Pt is received in the bed and was cleared for therapy session. Pt is SBA with bed mobility and to transfer to the EOB. Pt sat EOB for a few minutes and denied any dizziness and light headedness. Pt is CGA with sit<>stand transfers with the RW. Pt was able to AMB about 150' with the RW CGA for balance and safety. Pt with decreased margy and step length but good balance and safety awareness. Did not perform stair training this session. Pt did perform last therapy session and reported that he would be fine when dc to home. Returned pt back to the room and to sitting in the chair with all needs within reach. Pt is on track to dc to home once medically cleared. Reported to the RN on the status of the pt. Bed mobility: Supervision  Transfers: Contact guard assist  Gait Assistance: Contact guard assist  Distance (ft): 150'  Assistive Device: Rolling walker  Pattern: L Decreased stance time          . Patient was left in bedside chair at end of session with all needs in reach.  The patient's Approx Degree of Impairment: 46.58% has been calculated based on documentation in the [facility] '6 clicks' Inpatient Basic Mobility Short Form. Research supports that patients with this level of impairment may benefit from Home with home health PT. RN aware of patient status post session. DISCHARGE RECOMMENDATIONS  PT Discharge Recommendations: Home with home health PT;24 hour care/supervision     PLAN  PT Treatment Plan: Bed mobility; Body mechanics; Coordination; Endurance; Patient education;Gait training;Range of motion;Strengthening;Stoop training;Stair training;Transfer training;Balance training    SUBJECTIVE  Pt was agreeable to therapy session. OBJECTIVE  Precautions: None    WEIGHT BEARING RESTRICTION  Weight Bearing Restriction: L lower extremity           L Lower Extremity: Weight Bearing as Tolerated    PAIN ASSESSMENT   Ratin  Location: L knee  Management Techniques: Activity promotion; Body mechanics; Relaxation;Repositioning    BALANCE                                                                                                                       Static Sitting: Good  Dynamic Sitting: Fair +           Static Standing: Fair -  Dynamic Standing: Fair -    ACTIVITY TOLERANCE                        O2 WALK       AM-PAC '6-Clicks' INPATIENT SHORT FORM - BASIC MOBILITY  How much difficulty does the patient currently have. .. Patient Difficulty: Turning over in bed (including adjusting bedclothes, sheets and blankets)?: A Little   Patient Difficulty: Sitting down on and standing up from a chair with arms (e.g., wheelchair, bedside commode, etc.): A Little   Patient Difficulty: Moving from lying on back to sitting on the side of the bed?: A Little   How much help from another person does the patient currently need. ..    Help from Another: Moving to and from a bed to a chair (including a wheelchair)?: A Little   Help from Another: Need to walk in hospital room?: A Little   Help from Another: Climbing 3-5 steps with a railing?: A Little     AM-PAC Score:  Raw Score: 18   Approx Degree of Impairment: 46.58%   Standardized Score (AM-PAC Scale): 43.63   CMS Modifier (G-Code): CK      Patient End of Session: Up in chair;Needs met;Call light within reach;RN aware of session/findings; All patient questions and concerns addressed    CURRENT GOALS   Goals to be met by: 11/18/22  Patient Goal Patient's self-stated goal is: go home   Goal #1 Patient is able to demonstrate supine - sit EOB @ level: modified independent     Goal #1   Current Status SBA   Goal #2 Patient is able to demonstrate transfers Sit to/from Stand at assistance level: modified independent     Goal #2  Current Status CGA with the RW   Goal #3 Patient is able to ambulate 300 feet with assistive device at assistance level: modified independent    Goal #3   Current Status 150 ft RW SBA/CGA   Goal #4 Patient will negotiate 15 stairs/one curb w/ assistive device and supervision   Goal #4   Current Status NT   Goal #5  AROM 0 degrees extension to 95 degrees flexion L knee    Goal #5   Current Status In progress   Goal #6 Patient independently performs home exercise program for ROM/strengthening per the instructions provided in preparation for discharge.    Goal #6  Current Status In progress

## 2022-11-13 NOTE — PLAN OF CARE
Post-op day #2 L TKA. Dry dressing in place to L knee, no drainage today. Tolerating diet. Has only voided once since last straight cath. Bladder scan resulted 718ml, per Dr. Horacio Veras, baxter placed and will not be discharged. SCDs, TEDS and eliquis for DVT prophylaxis. Norco Pain medication provided as needed. Up with standby assist and a walker. Encouraged frequent ambulation and use of incentive spirometer. Fall precautions maintained- bed alarm on, bed locked in lowest position, call light and personal belongings within reach, non-skid socks in place to bilateral feet. Frequent rounding by nursing staff. Plan is home tomorrow once baxter removed and voiding independently.      Problem: Patient Centered Care  Goal: Patient preferences are identified and integrated in the patient's plan of care  Description: Interventions:  - What would you like us to know as we care for you?   - Provide timely, complete, and accurate information to patient/family  - Incorporate patient and family knowledge, values, beliefs, and cultural backgrounds into the planning and delivery of care  - Encourage patient/family to participate in care and decision-making at the level they choose  - Honor patient and family perspectives and choices  Outcome: Progressing     Problem: PAIN - ADULT  Goal: Verbalizes/displays adequate comfort level or patient's stated pain goal  Description: INTERVENTIONS:  - Encourage pt to monitor pain and request assistance  - Assess pain using appropriate pain scale  - Administer analgesics based on type and severity of pain and evaluate response  - Implement non-pharmacological measures as appropriate and evaluate response  - Consider cultural and social influences on pain and pain management  - Manage/alleviate anxiety  - Utilize distraction and/or relaxation techniques  - Monitor for opioid side effects  - Notify MD/LIP if interventions unsuccessful or patient reports new pain  - Anticipate increased pain with activity and pre-medicate as appropriate  Outcome: Progressing     Problem: RISK FOR INFECTION - ADULT  Goal: Absence of fever/infection during anticipated neutropenic period  Description: INTERVENTIONS  - Monitor WBC  - Administer growth factors as ordered  - Implement neutropenic guidelines  Outcome: Progressing     Problem: SAFETY ADULT - FALL  Goal: Free from fall injury  Description: INTERVENTIONS:  - Assess pt frequently for physical needs  - Identify cognitive and physical deficits and behaviors that affect risk of falls.   - [place] fall precautions as indicated by assessment.  - Educate pt/family on patient safety including physical limitations  - Instruct pt to call for assistance with activity based on assessment  - Modify environment to reduce risk of injury  - Provide assistive devices as appropriate  - Consider OT/PT consult to assist with strengthening/mobility  - Encourage toileting schedule  Outcome: Progressing     Problem: DISCHARGE PLANNING  Goal: Discharge to home or other facility with appropriate resources  Description: INTERVENTIONS:  - Identify barriers to discharge w/pt and caregiver  - Include patient/family/discharge partner in discharge planning  - Arrange for needed discharge resources and transportation as appropriate  - Identify discharge learning needs (meds, wound care, etc)  - Arrange for interpreters to assist at discharge as needed  - Consider post-discharge preferences of patient/family/discharge partner  - Complete POLST form as appropriate  - Assess patient's ability to be responsible for managing their own health  - Refer to Case Management Department for coordinating discharge planning if the patient needs post-hospital services based on physician/LIP order or complex needs related to functional status, cognitive ability or social support system  Outcome: Progressing

## 2022-11-14 LAB
ANION GAP SERPL CALC-SCNC: 3 MMOL/L (ref 0–18)
BUN BLD-MCNC: 14 MG/DL (ref 7–18)
BUN/CREAT SERPL: 16.5 (ref 10–20)
CALCIUM BLD-MCNC: 8.1 MG/DL (ref 8.5–10.1)
CHLORIDE SERPL-SCNC: 105 MMOL/L (ref 98–112)
CO2 SERPL-SCNC: 33 MMOL/L (ref 21–32)
CREAT BLD-MCNC: 0.85 MG/DL
DEPRECATED RDW RBC AUTO: 42.2 FL (ref 35.1–46.3)
ERYTHROCYTE [DISTWIDTH] IN BLOOD BY AUTOMATED COUNT: 12.1 % (ref 11–15)
GFR SERPLBLD BASED ON 1.73 SQ M-ARVRAT: 96 ML/MIN/1.73M2 (ref 60–?)
GLUCOSE BLD-MCNC: 118 MG/DL (ref 70–99)
HCT VFR BLD AUTO: 35.3 %
HGB BLD-MCNC: 11 G/DL
MCH RBC QN AUTO: 29.5 PG (ref 26–34)
MCHC RBC AUTO-ENTMCNC: 31.2 G/DL (ref 31–37)
MCV RBC AUTO: 94.6 FL
OSMOLALITY SERPL CALC.SUM OF ELEC: 294 MOSM/KG (ref 275–295)
PLATELET # BLD AUTO: 132 10(3)UL (ref 150–450)
POTASSIUM SERPL-SCNC: 3.8 MMOL/L (ref 3.5–5.1)
RBC # BLD AUTO: 3.73 X10(6)UL
SODIUM SERPL-SCNC: 141 MMOL/L (ref 136–145)
WBC # BLD AUTO: 9 X10(3) UL (ref 4–11)

## 2022-11-14 PROCEDURE — 99233 SBSQ HOSP IP/OBS HIGH 50: CPT | Performed by: INTERNAL MEDICINE

## 2022-11-14 RX ORDER — POTASSIUM CHLORIDE 20 MEQ/1
40 TABLET, EXTENDED RELEASE ORAL DAILY
Status: DISCONTINUED | OUTPATIENT
Start: 2022-11-15 | End: 2022-11-15

## 2022-11-14 RX ORDER — SODIUM CHLORIDE 9 MG/ML
INJECTION, SOLUTION INTRAVENOUS CONTINUOUS
Status: DISCONTINUED | OUTPATIENT
Start: 2022-11-14 | End: 2022-11-15

## 2022-11-14 NOTE — PLAN OF CARE
Problem: Patient Centered Care  Goal: Patient preferences are identified and integrated in the patient's plan of care  Description: Interventions:  - What would you like us to know as we care for you?   - Provide timely, complete, and accurate information to patient/family  - Incorporate patient and family knowledge, values, beliefs, and cultural backgrounds into the planning and delivery of care  - Encourage patient/family to participate in care and decision-making at the level they choose  - Honor patient and family perspectives and choices  Outcome: Progressing     Problem: PAIN - ADULT  Goal: Verbalizes/displays adequate comfort level or patient's stated pain goal  Description: INTERVENTIONS:  - Encourage pt to monitor pain and request assistance  - Assess pain using appropriate pain scale  - Administer analgesics based on type and severity of pain and evaluate response  - Implement non-pharmacological measures as appropriate and evaluate response  - Consider cultural and social influences on pain and pain management  - Manage/alleviate anxiety  - Utilize distraction and/or relaxation techniques  - Monitor for opioid side effects  - Notify MD/LIP if interventions unsuccessful or patient reports new pain  - Anticipate increased pain with activity and pre-medicate as appropriate  Outcome: Progressing     Problem: RISK FOR INFECTION - ADULT  Goal: Absence of fever/infection during anticipated neutropenic period  Description: INTERVENTIONS  - Monitor WBC  - Administer growth factors as ordered  - Implement neutropenic guidelines  Outcome: Progressing     Problem: SAFETY ADULT - FALL  Goal: Free from fall injury  Description: INTERVENTIONS:  - Assess pt frequently for physical needs  - Identify cognitive and physical deficits and behaviors that affect risk of falls.   - [place] fall precautions as indicated by assessment.  - Educate pt/family on patient safety including physical limitations  - Instruct pt to call for assistance with activity based on assessment  - Modify environment to reduce risk of injury  - Provide assistive devices as appropriate  - Consider OT/PT consult to assist with strengthening/mobility  - Encourage toileting schedule  Outcome: Progressing     Problem: DISCHARGE PLANNING  Goal: Discharge to home or other facility with appropriate resources  Description: INTERVENTIONS:  - Identify barriers to discharge w/pt and caregiver  - Include patient/family/discharge partner in discharge planning  - Arrange for needed discharge resources and transportation as appropriate  - Identify discharge learning needs (meds, wound care, etc)  - Arrange for interpreters to assist at discharge as needed  - Consider post-discharge preferences of patient/family/discharge partner  - Complete POLST form as appropriate  - Assess patient's ability to be responsible for managing their own health  - Refer to Case Management Department for coordinating discharge planning if the patient needs post-hospital services based on physician/LIP order or complex needs related to functional status, cognitive ability or social support system  Outcome: Progressing    Patient ambulates 1 person assist with walker. Pain managed with PRN norco and gel ice. SCDs, ADRIANNA hose, and eliquis for DVT prophylaxis. Patient voiding with baxter, plan to remove at 0800 AM. Surgical dressing changed during shift. Clean, dry, and intact. No acute changes. Vitals signs as charted. Patient in lowest position, bed alarm on, call light within reach, using appropriately.

## 2022-11-14 NOTE — PLAN OF CARE
Problem: Patient Centered Care  Goal: Patient preferences are identified and integrated in the patient's plan of care  Description: Interventions:  - What would you like us to know as we care for you?   - Provide timely, complete, and accurate information to patient/family  - Incorporate patient and family knowledge, values, beliefs, and cultural backgrounds into the planning and delivery of care  - Encourage patient/family to participate in care and decision-making at the level they choose  - Honor patient and family perspectives and choices  Outcome: Progressing     Problem: Patient/Family Goals  Goal: Patient/Family Long Term Goal  Description: Patient's Long Term Goal:     Interventions:  -   - See additional Care Plan goals for specific interventions  Outcome: Progressing  Goal: Patient/Family Short Term Goal  Description: Patient's Short Term Goal:     Interventions:   -   - See additional Care Plan goals for specific interventions  Outcome: Progressing     Problem: PAIN - ADULT  Goal: Verbalizes/displays adequate comfort level or patient's stated pain goal  Description: INTERVENTIONS:  - Encourage pt to monitor pain and request assistance  - Assess pain using appropriate pain scale  - Administer analgesics based on type and severity of pain and evaluate response  - Implement non-pharmacological measures as appropriate and evaluate response  - Consider cultural and social influences on pain and pain management  - Manage/alleviate anxiety  - Utilize distraction and/or relaxation techniques  - Monitor for opioid side effects  - Notify MD/LIP if interventions unsuccessful or patient reports new pain  - Anticipate increased pain with activity and pre-medicate as appropriate  Outcome: Progressing     Problem: RISK FOR INFECTION - ADULT  Goal: Absence of fever/infection during anticipated neutropenic period  Description: INTERVENTIONS  - Monitor WBC  - Administer growth factors as ordered  - Implement neutropenic guidelines  Outcome: Progressing     Problem: SAFETY ADULT - FALL  Goal: Free from fall injury  Description: INTERVENTIONS:  - Assess pt frequently for physical needs  - Identify cognitive and physical deficits and behaviors that affect risk of falls.   - [place] fall precautions as indicated by assessment.  - Educate pt/family on patient safety including physical limitations  - Instruct pt to call for assistance with activity based on assessment  - Modify environment to reduce risk of injury  - Provide assistive devices as appropriate  - Consider OT/PT consult to assist with strengthening/mobility  - Encourage toileting schedule  Outcome: Progressing     Problem: DISCHARGE PLANNING  Goal: Discharge to home or other facility with appropriate resources  Description: INTERVENTIONS:  - Identify barriers to discharge w/pt and caregiver  - Include patient/family/discharge partner in discharge planning  - Arrange for needed discharge resources and transportation as appropriate  - Identify discharge learning needs (meds, wound care, etc)  - Arrange for interpreters to assist at discharge as needed  - Consider post-discharge preferences of patient/family/discharge partner  - Complete POLST form as appropriate  - Assess patient's ability to be responsible for managing their own health  - Refer to Case Management Department for coordinating discharge planning if the patient needs post-hospital services based on physician/LIP order or complex needs related to functional status, cognitive ability or social support system  Outcome: Progressing

## 2022-11-14 NOTE — PHYSICAL THERAPY NOTE
PHYSICAL THERAPY TREATMENT NOTE - INPATIENT     Room Number: 406/406-A       Presenting Problem: primary osteoarthritis of left knee; s/p L TKA    Problem List  Principal Problem:    Primary osteoarthritis of left knee  Active Problems:    Paroxysmal atrial fibrillation (HCC)    Essential hypertension    Hyperlipidemia    BPH (benign prostatic hyperplasia)    Preoperative testing      PHYSICAL THERAPY ASSESSMENT   Chart reviewed. RN  approved participation in physical therapy. PPE worn by therapist: mask, gloves and goggles. Patient was wearing a mask during session. Patient presented in bed with 6/10 pain. Patient with good  progress towards goals during this session. Education provided on Physical therapy plan of care and physiological benefits of out of bed mobility. Patient with good carryover. Bed mobility: Min assist  Transfers: Min assist  Gait Assistance: Contact guard assist  Distance (ft): 2 x 100  Assistive Device: Rolling walker  Pattern: L Decreased stance time          Pt seen daily. Chart reviewed;RN approved pt participation in PT RX. Min a for bed mobility and transfer. EOB sitting balance activity with emphasis on core stabilization. Pt amb 2 x 100 ft with RW and CGA. Attempted stairs trainin;pt unable. Ther ex. Patient was left in bedside chair at end of session with all needs in reach. The patient's Approx Degree of Impairment: 46.58% has been calculated based on documentation in the [facility] '6 clicks' Inpatient Basic Mobility Short Form. Research supports that patients with this level of impairment may benefit from Home with home health PT.  RN aware of patient status post session. DISCHARGE RECOMMENDATIONS  PT Discharge Recommendations: Home with home health PT     PLAN  PT Treatment Plan: Bed mobility; Endurance;Gait training;Strengthening    SUBJECTIVE  Pt reports being ready for PT RX    OBJECTIVE  Precautions: None    WEIGHT BEARING RESTRICTION  Weight Bearing Restriction: L lower extremity           L Lower Extremity: Weight Bearing as Tolerated    PAIN ASSESSMENT   Ratin  Location: L knee  Management Techniques: Activity promotion; Body mechanics; Relaxation;Repositioning    BALANCE                                                                                                                       Static Sitting: Good  Dynamic Sitting: Fair +           Static Standing: Fair -  Dynamic Standing: Fair -    ACTIVITY TOLERANCE                        O2 WALK       AM-PAC '6-Clicks' INPATIENT SHORT FORM - BASIC MOBILITY  How much difficulty does the patient currently have. .. Patient Difficulty: Turning over in bed (including adjusting bedclothes, sheets and blankets)?: A Little   Patient Difficulty: Sitting down on and standing up from a chair with arms (e.g., wheelchair, bedside commode, etc.): A Little   Patient Difficulty: Moving from lying on back to sitting on the side of the bed?: A Little   How much help from another person does the patient currently need. ..    Help from Another: Moving to and from a bed to a chair (including a wheelchair)?: A Little   Help from Another: Need to walk in hospital room?: A Little   Help from Another: Climbing 3-5 steps with a railing?: A Little     AM-PAC Score:  Raw Score: 18   Approx Degree of Impairment: 46.58%   Standardized Score (AM-PAC Scale): 43.63   CMS Modifier (G-Code): CK      Additional information:     THERAPEUTIC EXERCISES  Lower Extremity Ankle pumps  Hip AB/AD  Heel slides  Quad sets     Position Supine       Patient End of Session: Up in chair;Call light within reach;RN aware of session/findings;Bracing education provided per handout    CURRENT GOALS     Patient Goal Patient's self-stated goal is: go home   Goal #1 Patient is able to demonstrate supine - sit EOB @ level: modified independent     Goal #1   Current Status Min a   Goal #2 Patient is able to demonstrate transfers Sit to/from Stand at assistance level: modified independent Goal #2  Current Status Min a  with the RW   Goal #3 Patient is able to ambulate 300 feet with assistive device at assistance level: modified independent    Goal #3   Current Status Pt amb 2 x 100 ft with RW and CGa   Goal #4 Patient will negotiate 15 stairs/one curb w/ assistive device and supervision   Goal #4   Current Status Attempted;pt unable. RN aware. Goal #5  AROM 0 degrees extension to 95 degrees flexion L knee    Goal #5   Current Status In progress   Goal #6 Patient independently performs home exercise program for ROM/strengthening per the instructions provided in preparation for discharge.    Goal #6  Current Status In progress

## 2022-11-15 VITALS
DIASTOLIC BLOOD PRESSURE: 58 MMHG | OXYGEN SATURATION: 90 % | TEMPERATURE: 99 F | RESPIRATION RATE: 16 BRPM | BODY MASS INDEX: 34.79 KG/M2 | HEART RATE: 104 BPM | HEIGHT: 70 IN | SYSTOLIC BLOOD PRESSURE: 106 MMHG | WEIGHT: 243 LBS

## 2022-11-15 LAB
ANION GAP SERPL CALC-SCNC: 4 MMOL/L (ref 0–18)
BUN BLD-MCNC: 13 MG/DL (ref 7–18)
BUN/CREAT SERPL: 16 (ref 10–20)
CALCIUM BLD-MCNC: 8.2 MG/DL (ref 8.5–10.1)
CHLORIDE SERPL-SCNC: 105 MMOL/L (ref 98–112)
CO2 SERPL-SCNC: 30 MMOL/L (ref 21–32)
CREAT BLD-MCNC: 0.81 MG/DL
GFR SERPLBLD BASED ON 1.73 SQ M-ARVRAT: 97 ML/MIN/1.73M2 (ref 60–?)
GLUCOSE BLD-MCNC: 93 MG/DL (ref 70–99)
OSMOLALITY SERPL CALC.SUM OF ELEC: 288 MOSM/KG (ref 275–295)
POTASSIUM SERPL-SCNC: 4 MMOL/L (ref 3.5–5.1)
SODIUM SERPL-SCNC: 139 MMOL/L (ref 136–145)

## 2022-11-15 PROCEDURE — 99239 HOSP IP/OBS DSCHRG MGMT >30: CPT | Performed by: INTERNAL MEDICINE

## 2022-11-15 NOTE — CM/SW NOTE
11/15/22 1000   Discharge disposition   Expected discharge disposition Home-Health   Post Acute Care Provider [address]  (Rosetta Izquierdo)   Discharge transportation Private car     Pt discussed during nursing rounds. Pt is stable for dc today. MD dc order entered. Cheng Lind notified of dc plan for today. Bradley Martinez 82 [phone]    Plan: Home today w/ [id number] [facility] / to remain available for support and/or discharge planning. [place] .  Cecelia Watts RN, BSN  Nurse   [phone]

## 2022-11-15 NOTE — PHYSICAL THERAPY NOTE
PHYSICAL THERAPY TREATMENT NOTE - INPATIENT     Room Number: 406/406-A       Presenting Problem: primary osteoarthritis of left knee; s/p L TKA    Problem List  Principal Problem:    Primary osteoarthritis of left knee  Active Problems:    Paroxysmal atrial fibrillation (HCC)    Essential hypertension    Hyperlipidemia    BPH (benign prostatic hyperplasia)    Preoperative testing      PHYSICAL THERAPY ASSESSMENT   Chart reviewed. RN  approved participation in physical therapy. PPE worn by therapist: mask, gloves and goggles. Patient was wearing a mask during session. Patient presented in bed with 6/10 pain. Patient with good  progress towards goals during this session. Education provided on Physical therapy plan of care and physiological benefits of out of bed mobility. Patient with good carryover. Bed mobility: Min assist  Transfers: Min assist  Gait Assistance: Contact guard assist  Distance (ft): 2 x 100  Assistive Device: Rolling walker  Pattern: L Decreased stance time          Per RN [place] request,pt seen BID. Chart reviewed;RN approved pt participation in PT RX. Min a for bed mobility and transfer. EOB sitting balance activity with emphasis on core stabilization;family  Present;family education;all questions and concerns addressed. Pt educated on deep breathing as well as energy conservation technique. .Pt amb 2 x 100 ft with RW and CGA. Provided cuing for gait pattern as well as for postural awareness. .Navigated 2 x 4 stairs with CGA. Nati Clemons Ther ex. Patient was left in bedside chair at end of session with all needs in reach. The patient's Approx Degree of Impairment: 46.58% has been calculated based on documentation in the [facility] '6 clicks' Inpatient Basic Mobility Short Form. Research supports that patients with this level of impairment may benefit from Home with home health PT.  RN aware of patient status post session.     DISCHARGE RECOMMENDATIONS  PT Discharge Recommendations: Home with home health PT PLAN  PT Treatment Plan: Bed mobility; Endurance;Gait training    SUBJECTIVE  Pt reports being ready for PT RX    OBJECTIVE  Precautions: None    WEIGHT BEARING RESTRICTION  Weight Bearing Restriction: L lower extremity           L Lower Extremity: Weight Bearing as Tolerated    PAIN ASSESSMENT   Ratin  Location: L knee  Management Techniques: Activity promotion; Body mechanics; Relaxation;Repositioning    BALANCE                                                                                                                       Static Sitting: Good  Dynamic Sitting: Fair +           Static Standing: Fair -  Dynamic Standing: Fair -    ACTIVITY TOLERANCE                        O2 WALK       AM-PAC '6-Clicks' INPATIENT SHORT FORM - BASIC MOBILITY  How much difficulty does the patient currently have. .. Patient Difficulty: Turning over in bed (including adjusting bedclothes, sheets and blankets)?: A Little   Patient Difficulty: Sitting down on and standing up from a chair with arms (e.g., wheelchair, bedside commode, etc.): A Little   Patient Difficulty: Moving from lying on back to sitting on the side of the bed?: A Little   How much help from another person does the patient currently need. .. Help from Another: Moving to and from a bed to a chair (including a wheelchair)?: A Little   Help from Another: Need to walk in hospital room?: A Little   Help from Another: Climbing 3-5 steps with a railing?: A Little     AM-PAC Score:  Raw Score: 18   Approx Degree of Impairment: 46.58%   Standardized Score (AM-PAC Scale): 43.63   CMS Modifier (G-Code): CK      Additional information:     THERAPEUTIC EXERCISES  Lower Extremity Ankle pumps  Hip AB/AD  Heel slides  Quad sets     Position Supine       Patient End of Session: Up in chair;Call light within reach;RN aware of session/findings;Bracing education provided per handout; All patient questions and concerns addressed    CURRENT GOALS     Patient Goal Patient's self-stated goal is: go home   Goal #1 Patient is able to demonstrate supine - sit EOB @ level: modified independent     Goal #1   Current Status Min a   Goal #2 Patient is able to demonstrate transfers Sit to/from Stand at assistance level: modified independent     Goal #2  Current Status Min a  with the RW   Goal #3 Patient is able to ambulate 300 feet with assistive device at assistance level: modified independent    Goal #3   Current Status Pt amb 2 x 100 ft with RW and CGa   Goal #4 Patient will negotiate 15 stairs/one curb w/ assistive device and supervision   Goal #4   Current Status Navigated 2 x 4 stairs with CGA. Goal #5  AROM 0 degrees extension to 95 degrees flexion L knee    Goal #5   Current Status In progress   Goal #6 Patient independently performs home exercise program for ROM/strengthening per the instructions provided in preparation for discharge.    Goal #6  Current Status In progress

## 2022-11-15 NOTE — PLAN OF CARE
Pt A&O x4 on RA. No complaints of pain overnight. Pt is voiding freely. Plan is to discharge home with [facility]. Problem: Patient Centered Care  Goal: Patient preferences are identified and integrated in the patient's plan of care  Description: Interventions:  - What would you like us to know as we care for you? I'm looking forward to going home. - Provide timely, complete, and accurate information to patient/family  - Incorporate patient and family knowledge, values, beliefs, and cultural backgrounds into the planning and delivery of care  - Encourage patient/family to participate in care and decision-making at the level they choose  - Honor patient and family perspectives and choices  Outcome: Progressing     Problem: PAIN - ADULT  Goal: Verbalizes/displays adequate comfort level or patient's stated pain goal  Description: INTERVENTIONS:  - Encourage pt to monitor pain and request assistance  - Assess pain using appropriate pain scale  - Administer analgesics based on type and severity of pain and evaluate response  - Implement non-pharmacological measures as appropriate and evaluate response  - Consider cultural and social influences on pain and pain management  - Manage/alleviate anxiety  - Utilize distraction and/or relaxation techniques  - Monitor for opioid side effects  - Notify MD/LIP if interventions unsuccessful or patient reports new pain  - Anticipate increased pain with activity and pre-medicate as appropriate  Outcome: Progressing     Problem: RISK FOR INFECTION - ADULT  Goal: Absence of fever/infection during anticipated neutropenic period  Description: INTERVENTIONS  - Monitor WBC  - Administer growth factors as ordered  - Implement neutropenic guidelines  Outcome: Progressing     Problem: SAFETY ADULT - FALL  Goal: Free from fall injury  Description: INTERVENTIONS:  - Assess pt frequently for physical needs  - Identify cognitive and physical deficits and behaviors that affect risk of falls.   - [place] fall precautions as indicated by assessment.  - Educate pt/family on patient safety including physical limitations  - Instruct pt to call for assistance with activity based on assessment  - Modify environment to reduce risk of injury  - Provide assistive devices as appropriate  - Consider OT/PT consult to assist with strengthening/mobility  - Encourage toileting schedule  Outcome: Progressing     Problem: DISCHARGE PLANNING  Goal: Discharge to home or other facility with appropriate resources  Description: INTERVENTIONS:  - Identify barriers to discharge w/pt and caregiver  - Include patient/family/discharge partner in discharge planning  - Arrange for needed discharge resources and transportation as appropriate  - Identify discharge learning needs (meds, wound care, etc)  - Arrange for interpreters to assist at discharge as needed  - Consider post-discharge preferences of patient/family/discharge partner  - Complete POLST form as appropriate  - Assess patient's ability to be responsible for managing their own health  - Refer to Case Management Department for coordinating discharge planning if the patient needs post-hospital services based on physician/LIP order or complex needs related to functional status, cognitive ability or social support system  Outcome: Progressing

## 2022-11-15 NOTE — PLAN OF CARE
PT medically cleared for discharge. IV removed, education and discharge information provided. PT discharged home with family.    Problem: Patient Centered Care  Goal: Patient preferences are identified and integrated in the patient's plan of care  Description: Interventions:  - What would you like us to know as we care for you?   - Provide timely, complete, and accurate information to patient/family  - Incorporate patient and family knowledge, values, beliefs, and cultural backgrounds into the planning and delivery of care  - Encourage patient/family to participate in care and decision-making at the level they choose  - Honor patient and family perspectives and choices  Outcome: Adequate for Discharge     Problem: Patient/Family Goals  Goal: Patient/Family Long Term Goal  Description: Patient's Long Term Goal:     Interventions:  -   - See additional Care Plan goals for specific interventions  Outcome: Adequate for Discharge  Goal: Patient/Family Short Term Goal  Description: Patient's Short Term Goal:     Interventions:   -   - See additional Care Plan goals for specific interventions  Outcome: Adequate for Discharge     Problem: PAIN - ADULT  Goal: Verbalizes/displays adequate comfort level or patient's stated pain goal  Description: INTERVENTIONS:  - Encourage pt to monitor pain and request assistance  - Assess pain using appropriate pain scale  - Administer analgesics based on type and severity of pain and evaluate response  - Implement non-pharmacological measures as appropriate and evaluate response  - Consider cultural and social influences on pain and pain management  - Manage/alleviate anxiety  - Utilize distraction and/or relaxation techniques  - Monitor for opioid side effects  - Notify MD/LIP if interventions unsuccessful or patient reports new pain  - Anticipate increased pain with activity and pre-medicate as appropriate  Outcome: Adequate for Discharge     Problem: RISK FOR INFECTION - ADULT  Goal: Absence of fever/infection during anticipated neutropenic period  Description: INTERVENTIONS  - Monitor WBC  - Administer growth factors as ordered  - Implement neutropenic guidelines  Outcome: Adequate for Discharge     Problem: SAFETY ADULT - FALL  Goal: Free from fall injury  Description: INTERVENTIONS:  - Assess pt frequently for physical needs  - Identify cognitive and physical deficits and behaviors that affect risk of falls.   - [place] fall precautions as indicated by assessment.  - Educate pt/family on patient safety including physical limitations  - Instruct pt to call for assistance with activity based on assessment  - Modify environment to reduce risk of injury  - Provide assistive devices as appropriate  - Consider OT/PT consult to assist with strengthening/mobility  - Encourage toileting schedule  Outcome: Adequate for Discharge     Problem: DISCHARGE PLANNING  Goal: Discharge to home or other facility with appropriate resources  Description: INTERVENTIONS:  - Identify barriers to discharge w/pt and caregiver  - Include patient/family/discharge partner in discharge planning  - Arrange for needed discharge resources and transportation as appropriate  - Identify discharge learning needs (meds, wound care, etc)  - Arrange for interpreters to assist at discharge as needed  - Consider post-discharge preferences of patient/family/discharge partner  - Complete POLST form as appropriate  - Assess patient's ability to be responsible for managing their own health  - Refer to Case Management Department for coordinating discharge planning if the patient needs post-hospital services based on physician/LIP order or complex needs related to functional status, cognitive ability or social support system  Outcome: Adequate for Discharge

## 2022-11-17 ENCOUNTER — PATIENT OUTREACH (OUTPATIENT)
Dept: CASE MANAGEMENT | Age: 67
End: 2022-11-17

## 2022-11-17 NOTE — PROGRESS NOTES
VM received; pt requesting assistance w/scheduling apt (dc 11/15)    Dr Yajaira Kaur at [address]  [phone]  Follow up 2 weeks

## 2023-11-22 NOTE — CM/SW NOTE
[facility] received email from Dr. Pradip Holt office indicating pt is scheduled for an upcoming surgery.       DC notes indicate NO dispositions noted    Hali Napier [facility]

## 2023-11-27 ENCOUNTER — ANESTHESIA EVENT (OUTPATIENT)
Dept: SURGERY | Facility: HOSPITAL | Age: 68
DRG: 469 | End: 2023-11-27
Payer: MEDICARE

## 2023-11-27 ENCOUNTER — ANESTHESIA (OUTPATIENT)
Dept: SURGERY | Facility: HOSPITAL | Age: 68
DRG: 469 | End: 2023-11-27
Payer: MEDICARE

## 2023-11-27 ENCOUNTER — HOSPITAL ENCOUNTER (INPATIENT)
Facility: HOSPITAL | Age: 68
LOS: 1 days | Discharge: HOME HEALTH | DRG: 469 | End: 2023-11-30
Attending: ORTHOPAEDIC SURGERY | Admitting: ORTHOPAEDIC SURGERY
Payer: MEDICARE

## 2023-11-27 ENCOUNTER — APPOINTMENT (OUTPATIENT)
Dept: GENERAL RADIOLOGY | Facility: HOSPITAL | Age: 68
DRG: 469 | End: 2023-11-27
Payer: MEDICARE

## 2023-11-27 PROBLEM — M17.11 PRIMARY OSTEOARTHRITIS OF ONE KNEE, RIGHT: Status: ACTIVE | Noted: 2023-11-27

## 2023-11-27 LAB
HBV SURFACE AG SER-ACNC: <0.1 [IU]/L
HBV SURFACE AG SERPL QL IA: NONREACTIVE
HCV AB SERPL QL IA: NONREACTIVE
HIV 1+2 AB+HIV1 P24 AG SERPL QL IA: NONREACTIVE

## 2023-11-27 PROCEDURE — 0SRC0J9 REPLACEMENT OF RIGHT KNEE JOINT WITH SYNTHETIC SUBSTITUTE, CEMENTED, OPEN APPROACH: ICD-10-PCS | Performed by: ORTHOPAEDIC SURGERY

## 2023-11-27 PROCEDURE — 99222 1ST HOSP IP/OBS MODERATE 55: CPT | Performed by: HOSPITALIST

## 2023-11-27 PROCEDURE — 73560 X-RAY EXAM OF KNEE 1 OR 2: CPT

## 2023-11-27 DEVICE — IMPLANTABLE DEVICE: Type: IMPLANTABLE DEVICE | Status: FUNCTIONAL

## 2023-11-27 DEVICE — CEMENT BNE 40GM HI VISC RADPQ FOR REV SUR: Type: IMPLANTABLE DEVICE | Site: KNEE | Status: FUNCTIONAL

## 2023-11-27 DEVICE — PATELLA COMPONENT, 3-PEG
Type: IMPLANTABLE DEVICE | Site: KNEE | Status: FUNCTIONAL
Brand: LINKSYMPHOKNEE

## 2023-11-27 RX ORDER — HALOPERIDOL 5 MG/ML
0.5 INJECTION INTRAMUSCULAR ONCE AS NEEDED
Status: ACTIVE | OUTPATIENT
Start: 2023-11-27 | End: 2023-11-27

## 2023-11-27 RX ORDER — PROCHLORPERAZINE EDISYLATE 5 MG/ML
5 INJECTION INTRAMUSCULAR; INTRAVENOUS ONCE AS NEEDED
Status: ACTIVE | OUTPATIENT
Start: 2023-11-27 | End: 2023-11-27

## 2023-11-27 RX ORDER — BUPIVACAINE HYDROCHLORIDE 2.5 MG/ML
INJECTION, SOLUTION EPIDURAL; INFILTRATION; INTRACAUDAL AS NEEDED
Status: DISCONTINUED | OUTPATIENT
Start: 2023-11-27 | End: 2023-11-27 | Stop reason: HOSPADM

## 2023-11-27 RX ORDER — LIDOCAINE HYDROCHLORIDE 10 MG/ML
INJECTION, SOLUTION EPIDURAL; INFILTRATION; INTRACAUDAL; PERINEURAL AS NEEDED
Status: DISCONTINUED | OUTPATIENT
Start: 2023-11-27 | End: 2023-11-27 | Stop reason: SURG

## 2023-11-27 RX ORDER — TRANEXAMIC ACID 650 MG/1
1300 TABLET ORAL ONCE
Status: COMPLETED | OUTPATIENT
Start: 2023-11-27 | End: 2023-11-27

## 2023-11-27 RX ORDER — HYDROCODONE BITARTRATE AND ACETAMINOPHEN 7.5; 325 MG/1; MG/1
1 TABLET ORAL EVERY 6 HOURS PRN
Status: ACTIVE | OUTPATIENT
Start: 2023-11-27 | End: 2023-11-28

## 2023-11-27 RX ORDER — TERAZOSIN 5 MG/1
10 CAPSULE ORAL NIGHTLY
Status: DISCONTINUED | OUTPATIENT
Start: 2023-11-27 | End: 2023-11-30

## 2023-11-27 RX ORDER — HYDROMORPHONE HYDROCHLORIDE 1 MG/ML
0.6 INJECTION, SOLUTION INTRAMUSCULAR; INTRAVENOUS; SUBCUTANEOUS EVERY 5 MIN PRN
Status: DISCONTINUED | OUTPATIENT
Start: 2023-11-27 | End: 2023-11-27 | Stop reason: HOSPADM

## 2023-11-27 RX ORDER — METOCLOPRAMIDE HYDROCHLORIDE 5 MG/ML
10 INJECTION INTRAMUSCULAR; INTRAVENOUS ONCE
Status: COMPLETED | OUTPATIENT
Start: 2023-11-27 | End: 2023-11-27

## 2023-11-27 RX ORDER — FAMOTIDINE 10 MG/ML
20 INJECTION, SOLUTION INTRAVENOUS ONCE
Status: COMPLETED | OUTPATIENT
Start: 2023-11-27 | End: 2023-11-27

## 2023-11-27 RX ORDER — DIPHENHYDRAMINE HYDROCHLORIDE 50 MG/ML
25 INJECTION INTRAMUSCULAR; INTRAVENOUS ONCE AS NEEDED
Status: ACTIVE | OUTPATIENT
Start: 2023-11-27 | End: 2023-11-27

## 2023-11-27 RX ORDER — HYDROMORPHONE HYDROCHLORIDE 1 MG/ML
0.6 INJECTION, SOLUTION INTRAMUSCULAR; INTRAVENOUS; SUBCUTANEOUS
Status: ACTIVE | OUTPATIENT
Start: 2023-11-27 | End: 2023-11-28

## 2023-11-27 RX ORDER — ACETAMINOPHEN 500 MG
1000 TABLET ORAL ONCE
Status: COMPLETED | OUTPATIENT
Start: 2023-11-27 | End: 2023-11-27

## 2023-11-27 RX ORDER — MORPHINE SULFATE 1 MG/ML
INJECTION, SOLUTION EPIDURAL; INTRATHECAL; INTRAVENOUS
Status: COMPLETED | OUTPATIENT
Start: 2023-11-27 | End: 2023-11-27

## 2023-11-27 RX ORDER — FAMOTIDINE 20 MG/1
20 TABLET, FILM COATED ORAL 2 TIMES DAILY
Status: DISCONTINUED | OUTPATIENT
Start: 2023-11-27 | End: 2023-11-30

## 2023-11-27 RX ORDER — ONDANSETRON 2 MG/ML
4 INJECTION INTRAMUSCULAR; INTRAVENOUS EVERY 6 HOURS PRN
Status: DISCONTINUED | OUTPATIENT
Start: 2023-11-27 | End: 2023-11-30

## 2023-11-27 RX ORDER — METOPROLOL TARTRATE 1 MG/ML
5 INJECTION, SOLUTION INTRAVENOUS ONCE
Status: COMPLETED | OUTPATIENT
Start: 2023-11-27 | End: 2023-11-27

## 2023-11-27 RX ORDER — HYDROMORPHONE HYDROCHLORIDE 1 MG/ML
0.4 INJECTION, SOLUTION INTRAMUSCULAR; INTRAVENOUS; SUBCUTANEOUS
Status: ACTIVE | OUTPATIENT
Start: 2023-11-27 | End: 2023-11-28

## 2023-11-27 RX ORDER — ONDANSETRON 2 MG/ML
INJECTION INTRAMUSCULAR; INTRAVENOUS AS NEEDED
Status: DISCONTINUED | OUTPATIENT
Start: 2023-11-27 | End: 2023-11-27 | Stop reason: SURG

## 2023-11-27 RX ORDER — METOCLOPRAMIDE 10 MG/1
10 TABLET ORAL ONCE
Status: COMPLETED | OUTPATIENT
Start: 2023-11-27 | End: 2023-11-27

## 2023-11-27 RX ORDER — CYCLOBENZAPRINE HCL 10 MG
10 TABLET ORAL EVERY 8 HOURS PRN
Status: DISCONTINUED | OUTPATIENT
Start: 2023-11-27 | End: 2023-11-30

## 2023-11-27 RX ORDER — HYDROCODONE BITARTRATE AND ACETAMINOPHEN 5; 325 MG/1; MG/1
1 TABLET ORAL EVERY 4 HOURS PRN
Status: DISCONTINUED | OUTPATIENT
Start: 2023-11-27 | End: 2023-11-30

## 2023-11-27 RX ORDER — DIPHENHYDRAMINE HCL 25 MG
25 CAPSULE ORAL EVERY 4 HOURS PRN
Status: DISCONTINUED | OUTPATIENT
Start: 2023-11-27 | End: 2023-11-30

## 2023-11-27 RX ORDER — HYDROCODONE BITARTRATE AND ACETAMINOPHEN 10; 325 MG/1; MG/1
1 TABLET ORAL EVERY 4 HOURS PRN
Status: DISCONTINUED | OUTPATIENT
Start: 2023-11-27 | End: 2023-11-30

## 2023-11-27 RX ORDER — ONDANSETRON 2 MG/ML
4 INJECTION INTRAMUSCULAR; INTRAVENOUS EVERY 6 HOURS PRN
Status: DISCONTINUED | OUTPATIENT
Start: 2023-11-27 | End: 2023-11-27 | Stop reason: HOSPADM

## 2023-11-27 RX ORDER — CELECOXIB 100 MG/1
200 CAPSULE ORAL DAILY
Status: DISCONTINUED | OUTPATIENT
Start: 2023-11-28 | End: 2023-11-30

## 2023-11-27 RX ORDER — PRAVASTATIN SODIUM 10 MG
10 TABLET ORAL NIGHTLY
Status: DISCONTINUED | OUTPATIENT
Start: 2023-11-27 | End: 2023-11-30

## 2023-11-27 RX ORDER — HYDROMORPHONE HYDROCHLORIDE 1 MG/ML
0.5 INJECTION, SOLUTION INTRAMUSCULAR; INTRAVENOUS; SUBCUTANEOUS EVERY 4 HOURS PRN
Status: DISCONTINUED | OUTPATIENT
Start: 2023-11-27 | End: 2023-11-30

## 2023-11-27 RX ORDER — ACETAMINOPHEN 500 MG
1000 TABLET ORAL EVERY 8 HOURS PRN
Status: DISCONTINUED | OUTPATIENT
Start: 2023-11-27 | End: 2023-11-30

## 2023-11-27 RX ORDER — ACETAMINOPHEN 325 MG/1
650 TABLET ORAL EVERY 6 HOURS PRN
Status: ACTIVE | OUTPATIENT
Start: 2023-11-27 | End: 2023-11-28

## 2023-11-27 RX ORDER — NALOXONE HYDROCHLORIDE 0.4 MG/ML
0.08 INJECTION, SOLUTION INTRAMUSCULAR; INTRAVENOUS; SUBCUTANEOUS AS NEEDED
Status: DISCONTINUED | OUTPATIENT
Start: 2023-11-27 | End: 2023-11-27 | Stop reason: HOSPADM

## 2023-11-27 RX ORDER — SENNOSIDES 8.6 MG
17.2 TABLET ORAL NIGHTLY
Status: DISCONTINUED | OUTPATIENT
Start: 2023-11-27 | End: 2023-11-30

## 2023-11-27 RX ORDER — DIPHENHYDRAMINE HCL 25 MG
25 CAPSULE ORAL EVERY 4 HOURS PRN
Status: ACTIVE | OUTPATIENT
Start: 2023-11-27 | End: 2023-11-28

## 2023-11-27 RX ORDER — NALOXONE HYDROCHLORIDE 0.4 MG/ML
0.08 INJECTION, SOLUTION INTRAMUSCULAR; INTRAVENOUS; SUBCUTANEOUS
Status: ACTIVE | OUTPATIENT
Start: 2023-11-27 | End: 2023-11-28

## 2023-11-27 RX ORDER — METOCLOPRAMIDE HYDROCHLORIDE 5 MG/ML
10 INJECTION INTRAMUSCULAR; INTRAVENOUS EVERY 8 HOURS PRN
Status: DISCONTINUED | OUTPATIENT
Start: 2023-11-27 | End: 2023-11-27 | Stop reason: HOSPADM

## 2023-11-27 RX ORDER — MORPHINE SULFATE 4 MG/ML
2 INJECTION, SOLUTION INTRAMUSCULAR; INTRAVENOUS EVERY 10 MIN PRN
Status: DISCONTINUED | OUTPATIENT
Start: 2023-11-27 | End: 2023-11-27 | Stop reason: HOSPADM

## 2023-11-27 RX ORDER — CEFAZOLIN SODIUM/WATER 2 G/20 ML
2 SYRINGE (ML) INTRAVENOUS EVERY 8 HOURS
Qty: 40 ML | Refills: 0 | Status: COMPLETED | OUTPATIENT
Start: 2023-11-27 | End: 2023-11-28

## 2023-11-27 RX ORDER — CELECOXIB 200 MG/1
200 CAPSULE ORAL ONCE
Status: COMPLETED | OUTPATIENT
Start: 2023-11-27 | End: 2023-11-27

## 2023-11-27 RX ORDER — HYDROMORPHONE HYDROCHLORIDE 1 MG/ML
0.2 INJECTION, SOLUTION INTRAMUSCULAR; INTRAVENOUS; SUBCUTANEOUS EVERY 5 MIN PRN
Status: DISCONTINUED | OUTPATIENT
Start: 2023-11-27 | End: 2023-11-27 | Stop reason: HOSPADM

## 2023-11-27 RX ORDER — NALBUPHINE HYDROCHLORIDE 10 MG/ML
2.5 INJECTION, SOLUTION INTRAMUSCULAR; INTRAVENOUS; SUBCUTANEOUS EVERY 4 HOURS PRN
Status: ACTIVE | OUTPATIENT
Start: 2023-11-27 | End: 2023-11-28

## 2023-11-27 RX ORDER — BISACODYL 10 MG
10 SUPPOSITORY, RECTAL RECTAL
Status: DISCONTINUED | OUTPATIENT
Start: 2023-11-27 | End: 2023-11-30

## 2023-11-27 RX ORDER — FAMOTIDINE 20 MG/1
20 TABLET, FILM COATED ORAL ONCE
Status: COMPLETED | OUTPATIENT
Start: 2023-11-27 | End: 2023-11-27

## 2023-11-27 RX ORDER — BUPIVACAINE HYDROCHLORIDE 7.5 MG/ML
INJECTION, SOLUTION INTRASPINAL
Status: COMPLETED | OUTPATIENT
Start: 2023-11-27 | End: 2023-11-27

## 2023-11-27 RX ORDER — OXYCODONE HCL 10 MG/1
10 TABLET, FILM COATED, EXTENDED RELEASE ORAL ONCE
Status: COMPLETED | OUTPATIENT
Start: 2023-11-27 | End: 2023-11-27

## 2023-11-27 RX ORDER — MORPHINE SULFATE 4 MG/ML
4 INJECTION, SOLUTION INTRAMUSCULAR; INTRAVENOUS EVERY 10 MIN PRN
Status: DISCONTINUED | OUTPATIENT
Start: 2023-11-27 | End: 2023-11-27 | Stop reason: HOSPADM

## 2023-11-27 RX ORDER — MORPHINE SULFATE 10 MG/ML
6 INJECTION, SOLUTION INTRAMUSCULAR; INTRAVENOUS EVERY 10 MIN PRN
Status: DISCONTINUED | OUTPATIENT
Start: 2023-11-27 | End: 2023-11-27 | Stop reason: HOSPADM

## 2023-11-27 RX ORDER — DOCUSATE SODIUM 100 MG/1
100 CAPSULE, LIQUID FILLED ORAL 2 TIMES DAILY
Status: DISCONTINUED | OUTPATIENT
Start: 2023-11-27 | End: 2023-11-30

## 2023-11-27 RX ORDER — TRAMADOL HYDROCHLORIDE 50 MG/1
50 TABLET ORAL EVERY 6 HOURS PRN
Status: DISCONTINUED | OUTPATIENT
Start: 2023-11-27 | End: 2023-11-30

## 2023-11-27 RX ORDER — SODIUM CHLORIDE, SODIUM LACTATE, POTASSIUM CHLORIDE, CALCIUM CHLORIDE 600; 310; 30; 20 MG/100ML; MG/100ML; MG/100ML; MG/100ML
INJECTION, SOLUTION INTRAVENOUS CONTINUOUS
Status: DISCONTINUED | OUTPATIENT
Start: 2023-11-27 | End: 2023-11-30

## 2023-11-27 RX ORDER — SODIUM CHLORIDE, SODIUM LACTATE, POTASSIUM CHLORIDE, CALCIUM CHLORIDE 600; 310; 30; 20 MG/100ML; MG/100ML; MG/100ML; MG/100ML
INJECTION, SOLUTION INTRAVENOUS CONTINUOUS
Status: DISCONTINUED | OUTPATIENT
Start: 2023-11-27 | End: 2023-11-27 | Stop reason: HOSPADM

## 2023-11-27 RX ORDER — ONDANSETRON 2 MG/ML
4 INJECTION INTRAMUSCULAR; INTRAVENOUS ONCE AS NEEDED
Status: ACTIVE | OUTPATIENT
Start: 2023-11-27 | End: 2023-11-27

## 2023-11-27 RX ORDER — DIPHENHYDRAMINE HYDROCHLORIDE 50 MG/ML
12.5 INJECTION INTRAMUSCULAR; INTRAVENOUS EVERY 4 HOURS PRN
Status: DISCONTINUED | OUTPATIENT
Start: 2023-11-27 | End: 2023-11-30

## 2023-11-27 RX ORDER — CEFAZOLIN SODIUM/WATER 2 G/20 ML
2 SYRINGE (ML) INTRAVENOUS ONCE
Status: COMPLETED | OUTPATIENT
Start: 2023-11-27 | End: 2023-11-27

## 2023-11-27 RX ORDER — MAGNESIUM HYDROXIDE 1200 MG/15ML
LIQUID ORAL CONTINUOUS PRN
Status: DISCONTINUED | OUTPATIENT
Start: 2023-11-27 | End: 2023-11-27 | Stop reason: HOSPADM

## 2023-11-27 RX ORDER — ENEMA 19; 7 G/133ML; G/133ML
1 ENEMA RECTAL ONCE AS NEEDED
Status: DISCONTINUED | OUTPATIENT
Start: 2023-11-27 | End: 2023-11-30

## 2023-11-27 RX ORDER — HYDRALAZINE HYDROCHLORIDE 20 MG/ML
5 INJECTION INTRAMUSCULAR; INTRAVENOUS
Status: ACTIVE | OUTPATIENT
Start: 2023-11-27 | End: 2023-11-27

## 2023-11-27 RX ORDER — HYDROCODONE BITARTRATE AND ACETAMINOPHEN 7.5; 325 MG/1; MG/1
2 TABLET ORAL EVERY 6 HOURS PRN
Status: DISPENSED | OUTPATIENT
Start: 2023-11-27 | End: 2023-11-28

## 2023-11-27 RX ORDER — METOCLOPRAMIDE HYDROCHLORIDE 5 MG/ML
10 INJECTION INTRAMUSCULAR; INTRAVENOUS EVERY 8 HOURS PRN
Status: DISCONTINUED | OUTPATIENT
Start: 2023-11-27 | End: 2023-11-30

## 2023-11-27 RX ORDER — DIPHENHYDRAMINE HYDROCHLORIDE 50 MG/ML
12.5 INJECTION INTRAMUSCULAR; INTRAVENOUS EVERY 4 HOURS PRN
Status: ACTIVE | OUTPATIENT
Start: 2023-11-27 | End: 2023-11-28

## 2023-11-27 RX ORDER — LIDOCAINE HYDROCHLORIDE 10 MG/ML
INJECTION, SOLUTION INFILTRATION; PERINEURAL
Status: COMPLETED | OUTPATIENT
Start: 2023-11-27 | End: 2023-11-27

## 2023-11-27 RX ORDER — FAMOTIDINE 10 MG/ML
20 INJECTION, SOLUTION INTRAVENOUS 2 TIMES DAILY
Status: DISCONTINUED | OUTPATIENT
Start: 2023-11-27 | End: 2023-11-30

## 2023-11-27 RX ORDER — FUROSEMIDE 40 MG/1
40 TABLET ORAL EVERY MORNING
Status: DISCONTINUED | OUTPATIENT
Start: 2023-11-28 | End: 2023-11-30

## 2023-11-27 RX ORDER — POLYETHYLENE GLYCOL 3350 17 G/17G
17 POWDER, FOR SOLUTION ORAL DAILY PRN
Status: DISCONTINUED | OUTPATIENT
Start: 2023-11-27 | End: 2023-11-30

## 2023-11-27 RX ORDER — HYDROMORPHONE HYDROCHLORIDE 1 MG/ML
0.4 INJECTION, SOLUTION INTRAMUSCULAR; INTRAVENOUS; SUBCUTANEOUS EVERY 5 MIN PRN
Status: DISCONTINUED | OUTPATIENT
Start: 2023-11-27 | End: 2023-11-27 | Stop reason: HOSPADM

## 2023-11-27 RX ORDER — CLONIDINE HYDROCHLORIDE 0.1 MG/1
0.2 TABLET ORAL 2 TIMES DAILY
Status: DISCONTINUED | OUTPATIENT
Start: 2023-11-27 | End: 2023-11-30

## 2023-11-27 RX ORDER — FLECAINIDE ACETATE 50 MG/1
150 TABLET ORAL 2 TIMES DAILY
Status: DISCONTINUED | OUTPATIENT
Start: 2023-11-27 | End: 2023-11-30

## 2023-11-27 RX ORDER — MIDAZOLAM HYDROCHLORIDE 1 MG/ML
INJECTION INTRAMUSCULAR; INTRAVENOUS AS NEEDED
Status: DISCONTINUED | OUTPATIENT
Start: 2023-11-27 | End: 2023-11-27 | Stop reason: SURG

## 2023-11-27 RX ADMIN — CEFAZOLIN SODIUM/WATER 2 G: 2 G/20 ML SYRINGE (ML) INTRAVENOUS at 14:48:00

## 2023-11-27 RX ADMIN — LIDOCAINE HYDROCHLORIDE 5 ML: 10 INJECTION, SOLUTION INFILTRATION; PERINEURAL at 14:38:00

## 2023-11-27 RX ADMIN — LIDOCAINE HYDROCHLORIDE 50 MG: 10 INJECTION, SOLUTION EPIDURAL; INFILTRATION; INTRACAUDAL; PERINEURAL at 14:41:00

## 2023-11-27 RX ADMIN — MORPHINE SULFATE 0.3 MG: 1 INJECTION, SOLUTION EPIDURAL; INTRATHECAL; INTRAVENOUS at 14:40:00

## 2023-11-27 RX ADMIN — BUPIVACAINE HYDROCHLORIDE 1.8 ML: 7.5 INJECTION, SOLUTION INTRASPINAL at 14:40:00

## 2023-11-27 RX ADMIN — MIDAZOLAM HYDROCHLORIDE 2 MG: 1 INJECTION INTRAMUSCULAR; INTRAVENOUS at 14:38:00

## 2023-11-27 RX ADMIN — SODIUM CHLORIDE, SODIUM LACTATE, POTASSIUM CHLORIDE, CALCIUM CHLORIDE: 600; 310; 30; 20 INJECTION, SOLUTION INTRAVENOUS at 14:36:00

## 2023-11-27 RX ADMIN — ONDANSETRON 4 MG: 2 INJECTION INTRAMUSCULAR; INTRAVENOUS at 14:38:00

## 2023-11-27 NOTE — DISCHARGE INSTRUCTIONS
The patient will call for an appointment in the next 2 weeks for follow-up. The patient has been instructed on wound care and may shower if wound is clean and dry. Perform dry dressing change daily. Do NOT remove Prineo mesh adhesive overlying surgical incision. If the wound has drainage then dry dressing changes should be performed daily until the wound is dry. The patient has been instructed to contact the office if increasing drainage, redness, or swelling to the operative wound/extremity occurs. Similarly, if they develop fevers and chills they should call the office ASAP. The patient will continue to wear ADRIANNA hose to both legs for 3 weeks. They may remove them at night or if they are causing skin irritation. Resume Eliquis. Oral pain medications have been provided and instruction on administration given to the patient. If there are any questions or increasing or uncontrolled pain, the patient should call the office [phone]. The patient will resume a normal diet. They should anticipate a slight decrease in appetite after surgery, but should notify the office if there are any problems with nausea, vomiting, constipation or diarrhea. A stool softner has been ordered and should be taken regularly while on pain medications. Sometimes managing your health at home requires assistance. The [place] team has recognized your preference to use   [address] [place] [address], [address] [place] [address], [address] [place] [address]  Phone: [phone]  Fax: [phone]  A representative from the home health agency will contact you or your family to schedule your first visit.           Follow-up with PCP in one week -for low oxygen levels

## 2023-11-27 NOTE — H&P
History & Physical Examination    Patient Name: Wang Quinn MRN: [id number]  CSN: [id number]  YOB: 1955    Diagnosis: right knee DJD    Present Illness: Patient is a 79year old male with a history of right knee DJD. Cleared by their PMD for a R TKA. There are no contraindications to proceed with surgery today. Informed consent has been obtained and all RBA have been discussed. Medications Prior to Admission   Medication Sig Dispense Refill Last Dose    flecainide 100 MG Oral Tab Take 1 tablet (100 mg total) by mouth 2 (two) times daily. 1 1/2 tabs bid   11/26/2023 at 800    ergocalciferol 1.25 MG (78703 UT) Oral Cap Take by mouth daily. 11/20/2023    metoprolol tartrate 10 mg/mL Oral Suspension Take 5 mL (50 mg total) by mouth 2x Daily(Beta Blocker). 11/27/2023 at 800    ascorbic acid 1000 MG Oral Tab Take 2 tablets (2,000 mg total) by mouth daily. 11/20/2023    acetaminophen 500 MG Oral Tab Take 1 tablet (500 mg total) by mouth every 6 (six) hours as needed for Pain. 11/27/2023 at 800    furosemide 40 MG Oral Tab Take 1 tablet (40 mg total) by mouth every morning. 11/26/2023 at 800    potassium chloride 40 meq/30 ml (10%) Oral Solution Take 15 mL (20 mEq total) by mouth daily. 11/26/2023 at 800    apixaban 5 MG Oral Tab Take 1 tablet (5 mg total) by mouth 2 (two) times daily. Stop taking 48 hrs. prior to surgery   11/24/2023 at 2100    doxazosin 8 MG Oral Tab Take 1 tablet (8 mg total) by mouth nightly. 11/26/2023 at 2000    simvastatin 10 MG Oral Tab Take 0.5 tablets (5 mg total) by mouth nightly. 11/26/2023 at 2000    cloNIDine 0.1 MG Oral Tab Take 2 tablets (0.2 mg total) by mouth 2 (two) times daily. 11/27/2023 at 800    traMADol 50 MG Oral Tab Take 1 tablet (50 mg total) by mouth every 6 (six) hours as needed for Pain.    11/20/2023       Current Facility-Administered Medications   Medication Dose Route Frequency    tranexamic acid (Lysteda) tab 1,300 mg 1,300 mg Oral Once    celecoxib (CeleBREX) cap 200 mg  200 mg Oral Once    oxyCODONE ER (OxyCONTIN ER) 12 hr tab 10 mg  10 mg Oral Once    ceFAZolin (Ancef) 2 g in 20mL IV syringe premix  2 g Intravenous Once    lactated ringers infusion   Intravenous Continuous    acetaminophen (Tylenol Extra Strength) tab 1,000 mg  1,000 mg Oral Once    metoprolol tartrate (Lopressor) tab 25 mg  25 mg Oral Once PRN    famotidine (Pepcid) tab 20 mg  20 mg Oral Once    Or    famotidine (Pepcid) 20 mg/2mL injection 20 mg  20 mg Intravenous Once    metoclopramide (Reglan) tab 10 mg  10 mg Oral Once    Or    metoclopramide (Reglan) 5 mg/mL injection 10 mg  10 mg Intravenous Once       Allergies: Patient has no known allergies. Past Medical History:   Diagnosis Date    Arrhythmia     A-fib    High blood pressure     Osteoarthritis     Visual impairment     readers     Past Surgical History:   Procedure Laterality Date    ARTHROSCOPY OF JOINT UNLISTED      NDSC ABLATION & RCNSTJ ATRIA LMTD W/O BYPASS      TOTAL KNEE REPLACEMENT Left 11/11/2022    Dr. Josh Winkler     Family History   Problem Relation Age of Onset    Cancer Father     Cancer Mother      Social History     Tobacco Use    Smoking status: Former     Types: Cigarettes    Smokeless tobacco: Never   Substance Use Topics    Alcohol use: Yes     Alcohol/week: 3.0 standard drinks of alcohol     Types: 3 Cans of beer per week     Comment: socially         SYSTEM Check if Review is Normal Check if Physical Exam is Normal If not normal, please explain:   HEENT [ Cloria Spore [ ]    Peg Garduno [ Cloria Spore [ ]    HEART [ Cloria Spore [ ]    Rita Halima [ Cloria Spore [ ]    Madlyn Sake [ Cloria Spore [ ]    Stephanie Screen [ Cloria Spore [ ]    EXTREMITIES [ ] X Pain with ROM right knee , NVID, skin intact, no CT      OTHER        [ x ] I have discussed the risks and benefits and alternatives with the patient/family. They understand and agree to proceed with plan of care. [ x ] I have reviewed the History and Physical done within the last 30 days.   Any changes noted above.     Beckie Adkins PA-C  11/27/2023  1:18 PM  [phone]

## 2023-11-27 NOTE — CONSULTS
[facility]    PATIENT'S NAME: Otto Alegria   ATTENDING PHYSICIAN: Gayla Acevedo MD   CONSULTING PHYSICIAN: Leoncio May MD   PATIENT ACCOUNT#:   [de-identified]    LOCATION:  [facility] [address] PACU [address] [place] [address]  MEDICAL RECORD #:   [id number]       YOB: 1955  ADMISSION DATE:       11/27/2023      CONSULT DATE:  11/27/2023    REPORT OF CONSULTATION      REASON FOR ADMISSION:  Right total knee arthroplasty. REASON FOR CONSULTATION:  Medical comanagement. HISTORY OF PRESENT ILLNESS:  The patient is a 60-year-old  male with chronic right knee pain, debilitating, with severe primary osteoarthritis. Failed outpatient medical management options. He was evaluated by Orthopedic Surgery, Dr. Gayla Acevedo, and scheduled for above-mentioned procedure. Preoperatively he had a spinal block. Postoperatively transferred to PACU for further monitoring. PAST MEDICAL HISTORY:  Generalized osteoarthritis; paroxysmal atrial fibrillation, status post ablation procedure, currently anticoagulated with Eliquis; hypertension; and benign prostatic hypertrophy. PAST SURGICAL HISTORY:  Left total knee arthroplasty. MEDICATIONS:  Please see medication reconciliation list.     ALLERGIES:  No known drug allergies. SOCIAL HISTORY:  Ex-tobacco user. Social alcohol. No drug use. Lives with his family. Independent in his basic activities of daily living. FAMILY HISTORY:  Both parents had cancer. REVIEW OF SYSTEMS:  Currently resting in bed. No right knee pain. No chest pain, no shortness of breath. Other 12-point review of systems is negative. PHYSICAL EXAMINATION:    GENERAL:  Alert and oriented to time, place, and person. No acute distress. VITAL SIGNS:  Temperature 97.8, pulse 61, respiratory rate 24, blood pressure 107/77, pulse ox 93% on room air. HEENT:  Atraumatic. Oropharynx clear. Moist mucous membranes. Normal hard and soft palate.   Eyes:  Anicteric sclerae. NECK:  Supple. No lymphadenopathy. Trachea midline. Full range of motion. LUNGS:  Clear to auscultation bilaterally. Normal respiratory effort. HEART:  Regular rate and rhythm. S1 and S2 auscultated. No murmur. ABDOMEN:  Soft, nondistended. No tenderness. Positive bowel sounds. EXTREMITIES:  Right knee dressing. No leg edema, clubbing, or cyanosis. NEUROLOGIC:  Decreased sensation and muscle movement, both lower extremities, expected post spinal block. No other focal findings. ASSESSMENT AND PLAN:    1. Right knee primary osteoarthritis, status post right total knee arthroplasty, spinal block. Pain control. Neurovascular checks. Eliquis for deep venous thrombosis prophylaxis. Physical and occupational therapy. 2.   Paroxysmal atrial fibrillation. Currently in sinus rhythm. Continue home medications and monitor. 3.   Essential hypertension. Continue home medications. 4.   Hyperlipidemia. Continue home medications. 5.   Benign prostatic hypertrophy. Continue home medications.      Dictated By Erasmo Montero MD  d: 11/27/2023 16:56:01  t: 11/27/2023 17:31:40  Job [id number]  FB/

## 2023-11-27 NOTE — CM/SW NOTE
CM received email from [facility] liaison, Pt has been referred to [facility] prior to surgery by Dr. Dahlia Ramos. F2F Needed. [facility] to send [facility] ref upon admission via [facility]. Plan: Home w/ [facility] when medically clear. / to remain available for support and/or discharge planning. Louis Betts.  Haylie Isbell RN, BSN  Nurse   [phone]

## 2023-11-27 NOTE — CM/SW NOTE
Department  notified of request for [facility], aidin referrals started. Assigned CM/SW to follow up with pt/family on further discharge planning.        Sandro FR[facility]

## 2023-11-27 NOTE — ANESTHESIA PROCEDURE NOTES
Spinal Block    Date/Time: 11/27/2023 2:38 PM    Performed by: Terrence Rao MD  Authorized by: Terrence Rao MD      General Information and Staff    Start Time:  11/27/2023 2:38 PM  End Time:  11/27/2023 2:40 PM  Anesthesiologist:  Terrence Rao MD  Performed by:   Anesthesiologist  Preanesthetic Checklist: patient identified, IV checked, risks and benefits discussed, monitors and equipment checked, pre-op evaluation, timeout performed, anesthesia consent and sterile technique used      Procedure Details    Patient Position:  Sitting  Prep: ChloraPrep    Monitoring:  Cardiac monitor  Approach:  Midline  Location:  L3-4  Injection Technique:  Single-shot    Needle    Needle Type:  Pencil-tip  Needle Gauge:  24 G  Needle Length:  3.5 in    Assessment    Sensory Level:   Events: clear CSF, CSF aspirated, well tolerated and blood negative      Additional Comments

## 2023-11-27 NOTE — OPERATIVE REPORT
[address] [place] [address], [facility] 826, [address]    OPERATIVE REPORT    PATIENT NAME: Thony Vences  MR#: [id number]    DATE OF PROCEDURE: 11/27/2023  PREOPERATIVE DIAGNOSIS: Degenerative Arthritis (e.g., OA) right knee  POSTOPERATIVE DIAGNOSIS: Degenerative Arthritis (e.g., OA) right knee  SECONDARY DIAGNOSIS: Other  OPERATION PERFORMED: Right cemented total knee arthroplasty  SURGEON: Carlos Antonio  ASSISTANT(S): 1st: Franklyn Favre and 2nd: Herlinda FOWLER  YOB: 1955  ANESTHESIA: Spinal  BLOOD LOSS: 50  FLUIDS: 750 cc of lactated ringers  TOURNIQUET TIME: 78 minutes  DRAIN: None  SPECIMEN: Bone from the right knee  COMPLICATIONS: None  FINDINGS: Degenerative Arthritis (e.g., OA) right knee  IMPLANTS:  Femoral Component: Cemented, Cruciate Retaining, Right, Size 6  Tibial Component: [email or web address] - [place] Monoblock CoCrMo Tibial Baseplate (Cemented, Size 5, Lot#: [id number])  Tibial Insert: [employer] - [place] X-LINKed UC Tibial Insert (Ultra-Congruent, Crosslinked Vit-E, Size 5-6, 11mm, Lot#: [id number])  Patellar Component: [employer] [place] X-LINKed Monoblock Patella (3 Peg, Cemented, Crosslinked Vit-E, 34x8mm, Lot#: [id number])  Cement: Romaine with None antibiotics  Other Devices:  [employer] - Bone Cement R (1x40, Lot#: [id number])      DISPOSITION: Patient was taken to the recovery room in stable condition. BMI: 34.29  ACL Integrity: Absent    INDICATIONS: The patient is a 71-year-old man who presented to the office with progressively worsening right knee pain. His pain was refractory to all forms on non-operative management including nonsteroidal anti-inflammatory agents, activity modification of the knee and corticosteroid injection. The patient`s pain progressed to the point that his activities of daily living is limited and he had a very limited range of motion and ability to walk.  Based upon his radiographs, it showed severe degenerative joint disease of the right knee. He was indicated for a total knee arthroplasty based upon these clinical and radiographic findings. We reviewed the risks, benefits and alternatives to the procedure and informed consent was obtained. The risks discussed included, but were not limited, to infection, nerve injury, arterial injury, bleeding, deep venous thrombosis (DVT), pulmonary embolus, wear, lysis, need for reoperation, incision numbness, stiffness, loss of limb and loss of life. The patient understood these and informed consent was obtained as was medical clearance and there were no contraindications for surgery today. OPERATIVE TECHNIQUE: On Monday, November 27, 2023, the patient was identified in the holding area and taken to the operating room. Prior to going to the operating room 1300mg of oral tranexamic acid was administered in the holding area for intra-operative and post-operative blood management. After preoperative antibiotics (3 grams of Cefazolin were ordered to be completed within 24 hours of the surgery) were administered, Mr. Kalen Cook was given Spinal anesthetic. He was laid supine on the operative room table and a baxter catheter inserted under sterile conditions. We than placed a well padded tourniquet on the right upper thigh and the right lower extremity was sterilely prepped and draped in standard surgical fashion. At this point in time, a timeout was called, and it was noted that the right side was the appropriate side for the surgery and we were allowed to proceed. We then used an Esmarch to exsanguiate the lower extremity and elevated the tourniquet to 250 mmHg. A midline incision was made over the patient`s right knee, dissecting down through the dermal and epidermal layers into the subcutaneous tissue. Bovie cautery was used to maintain homeostasis as we dissected sharply down to the level of the knee capsule.  The knee capsule was identified and a Mid-vastus arthrotomy was performed at this time using a Bovie cautery. We carried this distally onto the proximal tibia, releasing the anterior horn of the medial meniscus. We then debrided the anterior horns of the medial and lateral menisci, the anterior cruciate ligament (ACL) and a portion of the patellar fat pad. We completed our medial release by dissecting from the midline around to the posteriomedial corner in a subperiosteal fashion along the tibia and removed bony osteophytes off the distal femur and proximal tibia at this time. Once this was done we then marked [place]'s line on the distal end of the femur and cannulated the femur with a drill. We suctioned the medullary canal and inserted our first intramedullary guide and pinned it in place in 5 degrees of valgus. A standard distal femoral resection cut was made using a sagittal saw and the bony blocks were excised. The patella was then elevated from the wound and the thickness of the patella was measured to be 24mm. We used a sagittal saw and a free hand cut to remove the determined amount of patella, leaving 16mm of bone behind. A patellar protector was placed over the cancellous surface of the patella, it was then retracted laterally. The extramedullary tibial guide was then placed along the anterior aspect of the lower right extremity and pinned in place in the appropriate position. We measured a 2mm section off the proximal medial tibia. Resection was made using the sagittal saw, taking care to leave a bone island posteriorly for the posterior cruciate ligament (PCL). Once this was complete, we then irrigated the wound with pulsatile lavage and placed a laminar  medially and laterally to remove the lateral and medial menisci, respectively. Once debrided, a 10mm spacer block was placed within the knee and noted that good balance of the knee in extension and good alignment of the cuts based upon the drop crow were achieved.  The femur was then sized to be a size 6 and size the tibia to be a size 5 and we drilled holes at approximately 3 degrees of external rotation into the distal femur based on the posterior condylar axis. This lined up nicely with Beckham's line and we then placed the four-in-one cutting block into these drill holes. This block was then centered over the distal end of the femur. We then made our anterior distal femoral resection and removed the bony block. It was noted that we had an excellent grand piano sign on the distal femur signifying good external rotation of our cutting guide. At this point, we made the posterior condylar, chamfer and trochlear cuts. Once this was complete, all bony blocks were removed and the trial components were placed; a size 6 [place] Monoblock CoCrMo CR Femoral Condyle femoral component and a size 5 [place] Mon[place]lock CoCrMo Tibial Baseplate tibial component pinned centered over the medial third of the tibial tubercle. A 12mm trial polyethylene insert was inserted into the tibial tray. The knee was taken through a range of motion and it was noted that there was excellent range of motion and good stability and tracking. The patella was then sized to be a size 34, drilled the 3 peg holes and place the patella trial as well. The knee was again taken through a range of motion and it was noted to have good stability and patella tracking. The keel for the final tibial component was then prepared by using the drill and punch. The femoral lug holes were prepped at this time as well. All trial components were removed and we opened up the final implants. The wound was irrigated with pulsatile lavage and the Romaine cement was mixed. The [place] Monoblock CoCrMo Tibial Baseplate Cemented, Size 5 tibial tray was cemented into place followed by the [place] Monoblock CoCrMo CR Femoral Condyle and the excess cement removed.  The 12mm trial polyethylene insert was placed within the knee and the knee was placed in full extension allowing the cement to cure under pressurization. The patellar component was cemented at this time and held in place with a patella clamp for the cement to cure. During cementation a dilute bedatine solution was used to soak the wound. Once the cement was fully hardened the patella clamp was removed and the knee was taken though a full range of motion. Excellent range of motion and good stability was noted, throughout the entire arc of motion with the 12mm trial insert in place. Therefore, the wound was irrigated with pulsatile lavage and we inserted the final [facility] X-LINKed UC Tibial Insert (Ultra-Congruent, Crosslinked Vit-E, Size 5-6, 11mm, Lot#: [id number]) Cruciate Retaining, Crosslinked Vit-E, Size 5-6, 12mm insert. Once this was locked into place the knee was placed in 45 degrees of flexion and the mid-vastus arthrotomy was closed. The capsule was closed with Catie Noam #2 in continuous fashion. Once the arthrotomy was closed we allowed the knee to flex under gravity and we noted there was 120 degrees of flexion and full extension. Subcutaneous closure was performed in interrupted fashion with Monocryl #2-0. The skin edges were approximated using Monocryl #3-0. The skin edges were sealed with a topical skin adhesive and a sterile dressing was placed over the wound. The tourniquet was released at this time for a total of 78 minutes. A hemovac drain was not required. At this time an Ace bandage wrapped from toe to thigh. All needle and sponge counts were correct prior to leaving the operating room. The patient was aroused in the operating room and taken to the recovery room in stable condition. Postoperatively, he will be weight bearing as tolerated. He will work with physical therapy. He will be on deep venous thrombosis prophylaxis for the next three weeks.  Based on medical history and extent of the surgery, the patient will be admitted to the hospital as an inpatient with an anticipated length of stay of 2-3 nights prior to discharge. Surgery was performed on 11/27/2023. The procedure performed was a Primary TKA. The surgical assistant was Mike Ferguson. They were an active participant in the case and participated as a surgical assistant in all critical and noncritical steps including:  - Retractor placement and holding  - Operating room setup and patient positioning  - Closure of the various layers of soft tissue and skin  - Insertion of pins and holding screws of guides  - Dressing placement  - Transfer of patient to the stretcher and transport to the recovery room  Mike Ferguson was a critical part of the case, and the surgery could not be performed without a qualified surgical assistant. I was present for all critical portions of the surgery and a backup surgeon was available at all times in case of emergency.     DICTATED BY: Brody Trejo  DATE: 2023-11-27  SIGNED: 2023-11-27  DISTRIBUTION LIST:  Brody Trejo

## 2023-11-27 NOTE — OPERATIVE REPORT
[facility]    TKA Brief Operative Note    Nona Deluca. Patient Status:  Outpatient in a Bed    1955 MRN [id number]   Location [address] [facility] Attending Sha Guido MD     PCP No primary care provider on file. Preop DX: right knee degenerative joint disease   Postop DX: right knee degenerative joint disease  Procedure:  right total knee arthroplasty  Surgeon: Shamika Bustillos MD  Assistants:  Nathanael Martini NP; Nikolay Skelton  Anesthesia: Spinal Anesthesia  EBL: 50 mL  Tourniquet Time: 78 minutes  Fluids: 750 mL  Specimen: Bone right knee  Findings: DJD  right knee  Drain: None  Preop ROM: 5 to 105 degrees  Implants: Link LSK  Complications: none  All needle and sponge counts correct prior to leaving the operating room. All assistants were a medical necessity to complete this procedure. Plan: Transfer to recovery room in stable condition. Transition to floor when stable. I was present and scrubbed for the entire procedure.     Herve Jones MD  [phone] (c)  [phone] (o)  2023

## 2023-11-28 ENCOUNTER — APPOINTMENT (OUTPATIENT)
Dept: GENERAL RADIOLOGY | Facility: HOSPITAL | Age: 68
DRG: 469 | End: 2023-11-28
Attending: INTERNAL MEDICINE
Payer: MEDICARE

## 2023-11-28 LAB
ANION GAP SERPL CALC-SCNC: 6 MMOL/L (ref 0–18)
BUN BLD-MCNC: 18 MG/DL (ref 9–23)
BUN/CREAT SERPL: 20.5 (ref 10–20)
CALCIUM BLD-MCNC: 8.7 MG/DL (ref 8.7–10.4)
CHLORIDE SERPL-SCNC: 108 MMOL/L (ref 98–112)
CO2 SERPL-SCNC: 27 MMOL/L (ref 21–32)
CREAT BLD-MCNC: 0.88 MG/DL
DEPRECATED RDW RBC AUTO: 43.8 FL (ref 35.1–46.3)
DEPRECATED RDW RBC AUTO: 43.9 FL (ref 35.1–46.3)
EGFRCR SERPLBLD CKD-EPI 2021: 94 ML/MIN/1.73M2 (ref 60–?)
ERYTHROCYTE [DISTWIDTH] IN BLOOD BY AUTOMATED COUNT: 12.8 % (ref 11–15)
ERYTHROCYTE [DISTWIDTH] IN BLOOD BY AUTOMATED COUNT: 12.8 % (ref 11–15)
GLUCOSE BLD-MCNC: 113 MG/DL (ref 70–99)
HCT VFR BLD AUTO: 40.4 %
HCT VFR BLD AUTO: 41.2 %
HGB BLD-MCNC: 12.9 G/DL
HGB BLD-MCNC: 13 G/DL
MCH RBC QN AUTO: 29.4 PG (ref 26–34)
MCH RBC QN AUTO: 29.5 PG (ref 26–34)
MCHC RBC AUTO-ENTMCNC: 31.6 G/DL (ref 31–37)
MCHC RBC AUTO-ENTMCNC: 31.9 G/DL (ref 31–37)
MCV RBC AUTO: 92.4 FL
MCV RBC AUTO: 93.2 FL
OSMOLALITY SERPL CALC.SUM OF ELEC: 295 MOSM/KG (ref 275–295)
PLATELET # BLD AUTO: 129 10(3)UL (ref 150–450)
PLATELET # BLD AUTO: 132 10(3)UL (ref 150–450)
POTASSIUM SERPL-SCNC: 4.3 MMOL/L (ref 3.5–5.1)
RBC # BLD AUTO: 4.37 X10(6)UL
RBC # BLD AUTO: 4.42 X10(6)UL
SODIUM SERPL-SCNC: 141 MMOL/L (ref 136–145)
WBC # BLD AUTO: 9.3 X10(3) UL (ref 4–11)
WBC # BLD AUTO: 9.7 X10(3) UL (ref 4–11)

## 2023-11-28 PROCEDURE — 99233 SBSQ HOSP IP/OBS HIGH 50: CPT | Performed by: INTERNAL MEDICINE

## 2023-11-28 PROCEDURE — 71045 X-RAY EXAM CHEST 1 VIEW: CPT | Performed by: INTERNAL MEDICINE

## 2023-11-28 NOTE — PHYSICAL THERAPY NOTE
PHYSICAL THERAPY KNEE EVALUATION - INPATIENT       Room Number: 433/433-A  Evaluation Date: 11/28/2023  Type of Evaluation: Initial  Physician Order: PT Eval and Treat    Presenting Problem: S/P R TKA 11/27/23  Co-Morbidities : OA, HTN, Afib, S/P L TKA 11/2022  Reason for Therapy: Mobility Dysfunction and Discharge Planning    PHYSICAL THERAPY ASSESSMENT     Patient is a 79year old male admitted 11/27/2023 S/P R TKA. Patient received seated in bedside chair, agreeable to physical therapy evaluation. Vital signs monitored as noted below, patient experiencing intermittent O2 desaturations at rest and with activity but immediately recovering with cues for deep breathing and incentive spirometry . Education with patient provided on Total knee exercise protocol, Physical therapy plan of care, and physiological benefits of out of bed mobility. Pt tolerating household and limited community distances with the rolling walker, able to perform number of stairs required to access home, pt moves quickly with verbal cues for breathing and activity pacing. Patient is cleared from a physical therapy standpoint for discharge as they have adequately achieved therapy goals to return home safely, however patient would benefit from continued inpatient therapy services if patient remains hospitalized to further progress towards prior level of function.       MOBILITY:  SIT TO STAND: modified independent   STAND TO SIT: modified independent   GAIT  Gait Assistance: Supervision  Distance (ft): 120'x2  Assistive Device: Rolling walker  Pattern: R Decreased stance time (step-through pattern, heavy use of BUEs through RW to offload RLE in stance phase, cues to maintain upright posture and proximity to RW, no LOB)   STAIR NEGOTIATION: supervision/setup assist - 8 stairs with single handrail, step-to pattern, verbal cues for sequencing to descend leading with surgical limb to minimize pain    Patient is currently functioning below baseline with gait and stair negotiation as a result of the following impairments: pain and limited L knee ROM. Next session anticipate to progress gait and stair negotiation. Patient history and/or personal factors that may impact the plan of care include home accessibility concerns, co-morbidities (OA, HTN, Afib) affecting endurance, medical status, prior surgeries (L TKA 11/2022), and longstanding history of pain. Based on the physical therapy examination of the noted systems and functional activity/participation limitations, the patient presentation is stable given the patient demonstrates no significant barriers to meeting therapy goals. Patient would benefit from continued skilled physical therapy interventions to maximize patient safety and functional independence. Updated nurse on results of session, patient left seated in bedside chair, all lines intact, all needs met with call light in reach. The patient's Approx Degree of Impairment: 35.83% has been calculated based on documentation in the [facility] '6 clicks' Inpatient Basic Mobility Short Form. Research supports that patients with this level of impairment may benefit from home with no services, however recommend D/C to OP PT to facilitate full return of strength and ROM to surgical limb. Patient will benefit from continued IP PT services to address these deficits in preparation for discharge. DISCHARGE RECOMMENDATIONS  PT Discharge Recommendations: Home;Outpatient PT    PLAN  PT Treatment Plan: Body mechanics; Endurance;Gait training;Stair training  Rehab Potential : Good  Frequency (Obs): BID       PHYSICAL THERAPY MEDICAL/SOCIAL HISTORY       Problem List  Principal Problem:    Primary osteoarthritis of one knee, right  Active Problems:    Paroxysmal atrial fibrillation (HCC)    Essential hypertension    Hyperlipidemia    BPH (benign prostatic hyperplasia)      HOME SITUATION  Home Situation  Type of Home: House  Home Layout: One level  Stairs to [email or web address] : 10  Railing: Yes  Lives With: Spouse (wife retired, available)  Drives: No  Patient Owned Equipment: Rolling walker     Prior Level of [place]: independent no assistive device,     SUBJECTIVE  \"The one good thing about this time is I don't have the other side as my bad knee. \"    PHYSICAL THERAPY EXAMINATION     OBJECTIVE  Precautions: None  Fall Risk: Standard fall risk    WEIGHT BEARING RESTRICTION        R Lower Extremity: Weight Bearing as Tolerated       PAIN ASSESSMENT  Ratin  Location: R knee  Management Techniques: Body mechanics; Activity promotion    COGNITION  Overall Cognitive Status:  WFL - within functional limits    RANGE OF MOTION AND STRENGTH ASSESSMENT  Upper extremity ROM and strength are within functional limits  BUEs  Lower extremity ROM is within functional limits except for the following:   Right Knee extension -15 degrees  Right Knee flexion 90 degrees  Lower extremity strength is within functional limits except for the following:    Right Knee extension  3-/5  Right Knee flexion  3-/5    BALANCE  Static Sitting: Normal  Dynamic Sitting: Normal  Static Standing: Normal  Dynamic Standing: Good                                                                       ACTIVITY TOLERANCE  Pulse: 101 (at rest, 139 with activity)  Heart Rate Source: Monitor     BP: 95/61  BP Location: Right arm  BP Method: Automatic  Patient Position: Sitting    O2 WALK  Oxygen Therapy  SPO2% on Room Air at Rest: 96  SPO2% Ambulation on Room Air: 83 (immediately recovering to >88% with cues for deep breathing and incentive spirometry)    AM-PAC '6-Clicks' INPATIENT SHORT FORM - BASIC MOBILITY  How much difficulty does the patient currently have. ..   Patient Difficulty: Turning over in bed (including adjusting bedclothes, sheets and blankets)?: A Little   Patient Difficulty: Sitting down on and standing up from a chair with arms (e.g., wheelchair, bedside commode, etc.): None   Patient Difficulty: Moving from lying on back to sitting on the side of the bed?: None   How much help from another person does the patient currently need. .. Help from Another: Moving to and from a bed to a chair (including a wheelchair)?: A Little   Help from Another: Need to walk in hospital room?: A Little   Help from Another: Climbing 3-5 steps with a railing?: A Little     AM-PAC Score:  Raw Score: 20   Approx Degree of Impairment: 35.83%   Standardized Score (AM-PAC Scale): 47.67   CMS Modifier (G-Code): CJ    FUNCTIONAL ABILITY STATUS  Functional Mobility/Gait Assessment  Gait Assistance: Supervision  Distance (ft): 120'x2  Assistive Device: Rolling walker  Pattern: R Decreased stance time (step-through pattern, heavy use of BUEs through RW to offload RLE in stance phase, cues to maintain upright posture and proximity to RW, no LOB)  Stairs: Stairs  How Many Stairs: 8  Device: 1 Rail  Assist: Supervision  Pattern: Ascend and Descend  Ascend and Descend : Step to (cues for sequencing)    Exercise/Education Provided:  Bed mobility  Body mechanics  Energy conservation  Functional activity tolerated  Gait training  Posture  ROM  Strengthening  Transfer training    Patient End of Session: Up in chair;Needs met;Call light within reach;RN aware of session/findings; All patient questions and concerns addressed    CURRENT GOALS    Goals to be met by: 11/30/23  Patient Goal Patient's self-stated goal is: return home safely   Goal #1 Patient is able to demonstrate supine - sit EOB @ level: modified independent     Goal #1   Current Status    Goal #2 Patient is able to demonstrate transfers Sit to/from Stand at assistance level: modified independent     Goal #2  Current Status    Goal #3 Patient is able to ambulate 300 feet with assistive device at assistance level: modified independent    Goal #3   Current Status    Goal #4 Patient will negotiate 10 stairs/one curb w/ assistive device and supervision   Goal #4   Current Status Goal #5  AROM 0 degrees extension to 95 degrees flexion     Goal #5   Current Status    Goal #6 Patient independently performs home exercise program for ROM/strengthening per the instructions provided in preparation for discharge.    Goal #6  Current Status      Patient Evaluation Complexity Level:  History Moderate - 1 or 2 personal factors and/or co-morbidities   Examination of body systems Moderate - addressing a total of 3 or more elements   Clinical Presentation Low - Stable   Clinical Decision Making Low Complexity       Gait Trainin minutes      Seth Henson PT, DPT  [facility]  Inpatient Rehabilitation  Physical Therapy  [phone]

## 2023-11-28 NOTE — PLAN OF CARE
Post-op day #1. Dressing in place to right knee. Monitoring vital signs- stable at this time. No acute changes noted throughout shift. Receiving IV fluids per MD order. No nausea or vomiting this shift. Voiding with baxter catheter, will be removed per order. SCDs and Eliquis for DVT prophylaxis. Norco provided as needed for pain. Not OOB yet. Encouraged frequent use of incentive spirometer. Fall precautions maintained- bed alarm on, bed at lowest position, call light and personal belongings within reach, non-skid socks in place to bilateral feet. Frequent rounding by nursing staff. Plan for pt/ot eval.    Problem: Patient Centered Care  Goal: Patient preferences are identified and integrated in the patient's plan of care  Description: Interventions:  - What would you like us to know as we care for you?  I was here last November for surgery as well  - Provide timely, complete, and accurate information to patient/family  - Incorporate patient and family knowledge, values, beliefs, and cultural backgrounds into the planning and delivery of care  - Encourage patient/family to participate in care and decision-making at the level they choose  - Honor patient and family perspectives and choices  Outcome: Progressing     Problem: Patient/Family Goals  Goal: Patient/Family Long Term Goal  Description: Patient's Long Term Goal:  no complications    Interventions:  - follow md order, pain control, pt/ot, monitor labs, monitor incision  - See additional Care Plan goals for specific interventions  Outcome: Progressing  Goal: Patient/Family Short Term Goal  Description: Patient's Short Term Goal: pain control    Interventions:   - pain meds  -ice  -reposition    - See additional Care Plan goals for specific interventions  Outcome: Progressing     Problem: PAIN - ADULT  Goal: Verbalizes/displays adequate comfort level or patient's stated pain goal  Description: INTERVENTIONS:  - Encourage pt to monitor pain and request assistance  - Assess pain using appropriate pain scale  - Administer analgesics based on type and severity of pain and evaluate response  - Implement non-pharmacological measures as appropriate and evaluate response  - Consider cultural and social influences on pain and pain management  - Manage/alleviate anxiety  - Utilize distraction and/or relaxation techniques  - Monitor for opioid side effects  - Notify MD/LIP if interventions unsuccessful or patient reports new pain  - Anticipate increased pain with activity and pre-medicate as appropriate  Outcome: Progressing     Problem: RISK FOR INFECTION - ADULT  Goal: Absence of fever/infection during anticipated neutropenic period  Description: INTERVENTIONS  - Monitor WBC  - Administer growth factors as ordered  - Implement neutropenic guidelines  Outcome: Progressing     Problem: SAFETY ADULT - FALL  Goal: Free from fall injury  Description: INTERVENTIONS:  - Assess pt frequently for physical needs  - Identify cognitive and physical deficits and behaviors that affect risk of falls.   - [place] fall precautions as indicated by assessment.  - Educate pt/family on patient safety including physical limitations  - Instruct pt to call for assistance with activity based on assessment  - Modify environment to reduce risk of injury  - Provide assistive devices as appropriate  - Consider OT/PT consult to assist with strengthening/mobility  - Encourage toileting schedule  Outcome: Progressing     Problem: DISCHARGE PLANNING  Goal: Discharge to home or other facility with appropriate resources  Description: INTERVENTIONS:  - Identify barriers to discharge w/pt and caregiver  - Include patient/family/discharge partner in discharge planning  - Arrange for needed discharge resources and transportation as appropriate  - Identify discharge learning needs (meds, wound care, etc)  - Arrange for interpreters to assist at discharge as needed  - Consider post-discharge preferences of patient/family/discharge partner  - Complete POLST form as appropriate  - Assess patient's ability to be responsible for managing their own health  - Refer to Case Management Department for coordinating discharge planning if the patient needs post-hospital services based on physician/LIP order or complex needs related to functional status, cognitive ability or social support system  Outcome: Progressing

## 2023-11-28 NOTE — ANESTHESIA POST-OP FOLLOW-UP NOTE
[facility]   Acute Pain Rounds Note  2023    Patient name: Samara Vera. 79year old male  : 1955  MRN: [id number]    Diagnosis: No diagnosis found.     S/P: TKA    Pain modality: Duramorph    Current hospital day: Hospital Day: 2    Pain Scores: 2    Current Medications:  Scheduled Meds:   sennosides  17.2 mg Oral Nightly    docusate sodium  100 mg Oral BID    famotidine  20 mg Oral BID    Or    famotidine  20 mg Intravenous BID    celecoxib  200 mg Oral Daily    apixaban  5 mg Oral [email or web address]    cloNIDine  0.2 mg Oral BID    terazosin  10 mg Oral Nightly    furosemide  40 mg Oral QAM    metoprolol tartrate  50 mg Oral 2x Daily(Beta Blocker)    pravastatin  10 mg Oral Nightly    flecainide  150 mg Oral BID     Continuous Infusions:   lactated ringers Stopped (23 1621)     PRN Meds:.cyclobenzaprine, polyethylene glycol (PEG 3350), magnesium hydroxide, bisacodyl, fleet enema, ondansetron, metoclopramide, diphenhydrAMINE **OR** diphenhydrAMINE, acetaminophen, HYDROcodone-acetaminophen, HYDROcodone-acetaminophen, HYDROmorphone, traMADol, naloxone, acetaminophen, HYDROcodone-acetaminophen, HYDROcodone-acetaminophen, HYDROmorphone, HYDROmorphone, diphenhydrAMINE **OR** diphenhydrAMINE, nalbuphine, influenza virus vaccine PF    Assessment:  Doing well no complications     Plan:  Po pain meds    Regine Dietz MD  Anesthesia Acute Pain Service [phone]

## 2023-11-28 NOTE — CM/SW NOTE
[facility] reserved via [employer]. F2F entered and attached to [employer]. Jo-Ann Rao.  Fadia Franz RN, BSN  Nurse   [phone]

## 2023-11-29 LAB
BASE EXCESS BLD CALC-SCNC: 4.4 MMOL/L (ref ?–2)
DEPRECATED RDW RBC AUTO: 42.1 FL (ref 35.1–46.3)
ERYTHROCYTE [DISTWIDTH] IN BLOOD BY AUTOMATED COUNT: 12.8 % (ref 11–15)
HCO3 BLDV-SCNC: 28.4 MEQ/L (ref 22–26)
HCT VFR BLD AUTO: 33.7 %
HGB BLD-MCNC: 11.2 G/DL
MCH RBC QN AUTO: 29.7 PG (ref 26–34)
MCHC RBC AUTO-ENTMCNC: 33.2 G/DL (ref 31–37)
MCV RBC AUTO: 89.4 FL
PCO2 BLDV: 43 MM HG (ref 38–50)
PH BLDV: 7.44 [PH] (ref 7.32–7.43)
PLATELET # BLD AUTO: 120 10(3)UL (ref 150–450)
PO2 BLDV: 173 MM HG (ref 35–40)
PUNCTURE CHARGE: NO
Q-T INTERVAL: 346 MS
QRS DURATION: 108 MS
QTC CALCULATION (BEZET): 486 MS
R AXIS: -4 DEGREES
RBC # BLD AUTO: 3.77 X10(6)UL
SAO2 % BLDV: >99 % (ref 60–85)
T AXIS: 48 DEGREES
VENTRICULAR RATE: 119 BPM
WBC # BLD AUTO: 9.8 X10(3) UL (ref 4–11)

## 2023-11-29 PROCEDURE — 99233 SBSQ HOSP IP/OBS HIGH 50: CPT | Performed by: HOSPITALIST

## 2023-11-29 RX ORDER — HYDROCODONE BITARTRATE AND ACETAMINOPHEN 10; 325 MG/1; MG/1
1 TABLET ORAL
Qty: 42 TABLET | Refills: 0 | Status: SHIPPED | OUTPATIENT
Start: 2023-11-29

## 2023-11-29 RX ORDER — METOPROLOL SUCCINATE 50 MG/1
50 TABLET, EXTENDED RELEASE ORAL
Status: DISCONTINUED | OUTPATIENT
Start: 2023-11-29 | End: 2023-11-30

## 2023-11-29 RX ORDER — DILTIAZEM HYDROCHLORIDE 5 MG/ML
10 INJECTION INTRAVENOUS ONCE
Status: COMPLETED | OUTPATIENT
Start: 2023-11-29 | End: 2023-11-29

## 2023-11-29 RX ORDER — SODIUM CHLORIDE 9 MG/ML
INJECTION, SOLUTION INTRAVENOUS CONTINUOUS
Status: DISCONTINUED | OUTPATIENT
Start: 2023-11-29 | End: 2023-11-29

## 2023-11-29 NOTE — PHYSICAL THERAPY NOTE
PHYSICAL THERAPY TREATMENT NOTE - INPATIENT     Room Number: 336/336-A       Presenting Problem: S/P R TKA 23    Problem List  Principal Problem:    Primary osteoarthritis of one knee, right  Active Problems:    Paroxysmal atrial fibrillation (HCC)    Essential hypertension    Hyperlipidemia    BPH (benign prostatic hyperplasia)      PHYSICAL THERAPY ASSESSMENT   Chart reviewed. RN approved participation in physical therapy. Patient was in afib with RVR this morning but that has resolved. Patient presented in bed with 5/10 pain. Patient with good  progress towards goals during this session. Education provided on Physical therapy plan of care and physiological benefits of out of bed mobility. Patient with good carryover. Pt needs O2. Bed mobility: Supervision  Transfers: Contact guard assist  Gait Assistance: Contact guard assist  Distance (ft):  (65)  Assistive Device: Rolling walker  Pattern: R Decreased stance time (step-through pattern, heavy use of BUEs through RW to offload RLE in stance phase, cues to maintain upright posture and proximity to RW, no LOB)          Patient was left in bedside chair at end of session with all needs in reach. The patient's Approx Degree of Impairment: 46.58% has been calculated based on documentation in the [facility] '6 clicks' Inpatient Basic Mobility Short Form. Research supports that patients with this level of impairment may benefit from Home with home health PT. RN aware of patient status post session. DISCHARGE RECOMMENDATIONS  PT Discharge Recommendations: Outpatient PT     PLAN  PT Treatment Plan: Bed mobility; Patient education;Gait training;Family education;Strengthening;Range of motion    SUBJECTIVE      OBJECTIVE  Precautions: None    WEIGHT BEARING RESTRICTION  Weight Bearing Restriction: R lower extremity        R Lower Extremity: Weight Bearing as Tolerated       PAIN ASSESSMENT   Ratin  Location: R knee  Management Techniques: Body mechanics; Activity promotion    BALANCE                                                                                                                       Static Sitting: Good  Dynamic Sitting: Good           Static Standing: Fair -  Dynamic Standing: Fair -    ACTIVITY TOLERANCE                         O2 WALK  Oxygen Therapy  SPO2% on Room Air at Rest: 92  At rest oxygen flow (liters per minute): 2    AM-PAC '6-Clicks' INPATIENT SHORT FORM - BASIC MOBILITY  How much difficulty does the patient currently have. .. Patient Difficulty: Turning over in bed (including adjusting bedclothes, sheets and blankets)?: A Little   Patient Difficulty: Sitting down on and standing up from a chair with arms (e.g., wheelchair, bedside commode, etc.): A Little   Patient Difficulty: Moving from lying on back to sitting on the side of the bed?: A Little   How much help from another person does the patient currently need. ..    Help from Another: Moving to and from a bed to a chair (including a wheelchair)?: A Little   Help from Another: Need to walk in hospital room?: A Little   Help from Another: Climbing 3-5 steps with a railing?: A Little     AM-PAC Score:  Raw Score: 18   Approx Degree of Impairment: 46.58%   Standardized Score (AM-PAC Scale): 43.63   CMS Modifier (G-Code): CK    Patient End of Session: Up in chair    CURRENT GOALS   Goals to be met by: 11/30/23  Patient Goal Patient's self-stated goal is: return home safely   Goal #1 Patient is able to demonstrate supine - sit EOB @ level: modified independent     Goal #1   Current Status In progress   Goal #2 Patient is able to demonstrate transfers Sit to/from Stand at assistance level: modified independent     Goal #2  Current Status In progress, CGA   Goal #3 Patient is able to ambulate 300 feet with assistive device at assistance level: modified independent    Goal #3   Current Status In progress, CGA 65 ft   Goal #4 Patient will negotiate 10 stairs/one curb w/ assistive device and supervision   Goal #4   Current Status NT   Goal #5  AROM 0 degrees extension to 95 degrees flexion     Goal #5   Current Status 1388   Goal #6 Patient independently performs home exercise program for ROM/strengthening per the instructions provided in preparation for discharge.    Goal #6  Current Status In progress     Patient Evaluation Complexity Level:  History Moderate - 1 or 2 personal factors and/or co-morbidities   Examination of body systems Moderate - addressing a total of 3 or more elements   Clinical Presentation Low - Stable   Clinical Decision Making Low Complexity     Therex: 16  Gait Trainin minutes

## 2023-11-29 NOTE — CONSULTS
Cardiology (consult dictated)    Assessment:  1. Status post right total knee arthroplasty, postop day #2. 2.  Emergence of A-fib with rapid ventricular response early this morning. Has been put on Cardizem drip but still remains tachycardic. Otherwise asymptomatic. 3.  History of paroxysmal atrial fibs in the past, including at the time of his left knee surgery a year ago. He has undergone 3 radiofrequency ablations for his A-fib most recently in January of this year. He is maintaining on flecainide for rhythm control  He takes Eliquis for stroke prevention. Plan:  1. Resume metoprolol tartrate 50 mg twice daily. 2.  For acute rate control, he has been started on a Cardizem drip. Will use the protocol to get rate control. Once the metoprolol takes effect we will wean and discontinue the Cardizem drip. He may or may not need oral diltiazem for rate control this admission. He has already been started back on his Eliquis.       Thank you, we will follow

## 2023-11-29 NOTE — CONSULTS
[facility]    PATIENT'S NAME: Evin [facility] PHYSICIAN: Rigo Rubio MD   CONSULTING PHYSICIAN: Belkis Agee. Suzie Reed MD   PATIENT ACCOUNT#:   [id number]    LOCATION:  [address] [place] [address] [address] #:   [id number]       YOB: 1955  ADMISSION DATE:       11/27/2023      CONSULT DATE:  11/29/2023    REPORT OF CONSULTATION      HISTORY OF PRESENT ILLNESS:  The patient is a pleasant 54-year-old patient who underwent elective right total knee arthroplasty 2 days ago on 11/27/2023. Early this morning he went into rapid atrial fibrillation, and we were asked to consult. He has a history of paroxysmal atrial fibrillation and sometimes persistent atrial fibrillation. He has had cardioversions in the past and 3 ablations including one in January of this year, although that work was done at [facility] where his electrophysiology doctor practices. The patient at this time denies having any chest pain, shortness of breath, dizziness, or palpitations. He does not have a history of ischemic heart disease and has historically normal LV systolic function. MEDICATIONS:  Home medications of vitamin D, other supplements over-the-counter, metoprolol tartrate 50 b.i.d., furosemide 40 q.a.m., potassium 20 q.a.m., apixaban 5 b.i.d., Cardura, simvastatin 20 at bedtime, flecainide 150 b.i.d. ALLERGIES:  No known drug allergies. SOCIAL HISTORY:  Nonsmoker. Occasional alcohol. 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 He is , and his wife is with him today. FAMILY HISTORY:  Negative for heart disease. REVIEW OF SYSTEMS:  Otherwise negative. PHYSICAL EXAMINATION:    GENERAL:  Well-developed, well-nourished male in no acute distress, alert and oriented x3. VITAL SIGNS:  Blood pressure 138/65. Respirations 18, unlabored. Pulse 115, rhythm regular. Temperature 99.3. Saturation 93%. HEENT:  Unremarkable. NECK:  Supple.   Jugular venous pressure normal.  Carotids brisk without bruits. No thyromegaly. LUNGS:  Clear bilaterally. HEART:  S1, S2 normal.  No gallop, murmur, rub, or click. ABDOMEN:  Benign. EXTREMITIES:  Warm and dry. Good pulses. No edema. Surgical changes noted in the right knee. LABORATORY DATA:  Basic chemistries are normal.  Hemoglobin 11.2, platelets 938. EKG shows atrial fibrillation with a rate of 119 with nonspecific ST changes, no acute ischemic changes. ASSESSMENT:    1.   Status post right total knee arthroplasty, postop day #2. 2.   Atrial fibrillation with rapid ventricular response this morning. He has been put on a Cardizem drip but still remains tachycardic. We will use protocol. Patient otherwise asymptomatic. 3.   History of paroxysmal atrial fibrillation in the past including at the time of his left knee surgery 1 year ago. History of previous ablation and cardioversions. Maintains on flecainide for rhythm control and Eliquis for stroke prevention. They have been restarted. PLAN:    1. Resume metoprolol tartrate 50 mg daily. 2.   For acute rate control, he has been started on a Cardizem drip. We will use protocol to get rate control. Once the metoprolol takes effect, we will wean and discontinue the Cardizem drip. He may or may not need oral diltiazem as well for rate control as he is discharged. He has already been started back on his Eliquis. Thank you for this consultation. Dictated By Todd Smiley.  Gregory Gonzales MD  d: 11/29/2023 09:48:42  t: 11/29/2023 09:53:51  Job [id number]  [facility] [place], [place]/

## 2023-11-29 NOTE — PLAN OF CARE
Notified by nursing staff that patient's heart rate 140-153 while ambulating. Patient recently underwent right total knee replacement and does have a history of atrial fibrillation on Eliquis. On presentation and during this admission, his heart rate has been 60-90 throughout the night heart rate up to the 120s while resting. EKG obtained which reveals atrial fibrillation with RVR. He is on lopressor 50 mg twice a day for rate control. Blood pressure noted to be within normal limits. Patient is asymptomatic, no chest pain, no lightheadedness, no dizziness. Patient given 0.5 L IV fluid and heart rate continues to be 110-118. Decision made to initiate Cardizem bolus along with IV Cardizem for better rate control. Discontinue Lopressor. If heart rate is not controlled with Cardizem, patient may need cardiology consult in the a.m.

## 2023-11-29 NOTE — PLAN OF CARE
Patient AOX4. 2L nasal cannula. General diet, tolerating well. Eliquis, teds, SCDs for dvt prophylaxis. PRN Norco given for pain management. Straight cath once per orders. Safety precautions in place. Call light within reach.      Problem: Patient Centered Care  Goal: Patient preferences are identified and integrated in the patient's plan of care  Description: Interventions:  - What would you like us to know as we care for you?   - Provide timely, complete, and accurate information to patient/family  - Incorporate patient and family knowledge, values, beliefs, and cultural backgrounds into the planning and delivery of care  - Encourage patient/family to participate in care and decision-making at the level they choose  - Honor patient and family perspectives and choices  Outcome: Progressing     Problem: Patient/Family Goals  Goal: Patient/Family Long Term Goal  Description: Patient's Long Term Goal:     Interventions:  -   - See additional Care Plan goals for specific interventions  Outcome: Progressing  Goal: Patient/Family Short Term Goal  Description: Patient's Short Term Goal:     Interventions:   -   - See additional Care Plan goals for specific interventions  Outcome: Progressing     Problem: PAIN - ADULT  Goal: Verbalizes/displays adequate comfort level or patient's stated pain goal  Description: INTERVENTIONS:  - Encourage pt to monitor pain and request assistance  - Assess pain using appropriate pain scale  - Administer analgesics based on type and severity of pain and evaluate response  - Implement non-pharmacological measures as appropriate and evaluate response  - Consider cultural and social influences on pain and pain management  - Manage/alleviate anxiety  - Utilize distraction and/or relaxation techniques  - Monitor for opioid side effects  - Notify MD/LIP if interventions unsuccessful or patient reports new pain  - Anticipate increased pain with activity and pre-medicate as appropriate  Outcome: Progressing     Problem: RISK FOR INFECTION - ADULT  Goal: Absence of fever/infection during anticipated neutropenic period  Description: INTERVENTIONS  - Monitor WBC  - Administer growth factors as ordered  - Implement neutropenic guidelines  Outcome: Progressing     Problem: SAFETY ADULT - FALL  Goal: Free from fall injury  Description: INTERVENTIONS:  - Assess pt frequently for physical needs  - Identify cognitive and physical deficits and behaviors that affect risk of falls.   - [place] fall precautions as indicated by assessment.  - Educate pt/family on patient safety including physical limitations  - Instruct pt to call for assistance with activity based on assessment  - Modify environment to reduce risk of injury  - Provide assistive devices as appropriate  - Consider OT/PT consult to assist with strengthening/mobility  - Encourage toileting schedule  Outcome: Progressing     Problem: DISCHARGE PLANNING  Goal: Discharge to home or other facility with appropriate resources  Description: INTERVENTIONS:  - Identify barriers to discharge w/pt and caregiver  - Include patient/family/discharge partner in discharge planning  - Arrange for needed discharge resources and transportation as appropriate  - Identify discharge learning needs (meds, wound care, etc)  - Arrange for interpreters to assist at discharge as needed  - Consider post-discharge preferences of patient/family/discharge partner  - Complete POLST form as appropriate  - Assess patient's ability to be responsible for managing their own health  - Refer to Case Management Department for coordinating discharge planning if the patient needs post-hospital services based on physician/LIP order or complex needs related to functional status, cognitive ability or social support system  Outcome: Progressing

## 2023-11-30 VITALS
OXYGEN SATURATION: 95 % | DIASTOLIC BLOOD PRESSURE: 63 MMHG | SYSTOLIC BLOOD PRESSURE: 112 MMHG | WEIGHT: 246 LBS | HEART RATE: 82 BPM | RESPIRATION RATE: 18 BRPM | BODY MASS INDEX: 35.22 KG/M2 | HEIGHT: 70 IN | TEMPERATURE: 98 F

## 2023-11-30 LAB
ANION GAP SERPL CALC-SCNC: 6 MMOL/L (ref 0–18)
BASOPHILS # BLD AUTO: 0.04 X10(3) UL (ref 0–0.2)
BASOPHILS NFR BLD AUTO: 0.4 %
BUN BLD-MCNC: 18 MG/DL (ref 9–23)
BUN/CREAT SERPL: 21.2 (ref 10–20)
CALCIUM BLD-MCNC: 8.5 MG/DL (ref 8.7–10.4)
CHLORIDE SERPL-SCNC: 107 MMOL/L (ref 98–112)
CO2 SERPL-SCNC: 30 MMOL/L (ref 21–32)
CREAT BLD-MCNC: 0.85 MG/DL
DEPRECATED RDW RBC AUTO: 42.9 FL (ref 35.1–46.3)
DEPRECATED RDW RBC AUTO: 42.9 FL (ref 35.1–46.3)
EGFRCR SERPLBLD CKD-EPI 2021: 95 ML/MIN/1.73M2 (ref 60–?)
EOSINOPHIL # BLD AUTO: 0.52 X10(3) UL (ref 0–0.7)
EOSINOPHIL NFR BLD AUTO: 5.6 %
ERYTHROCYTE [DISTWIDTH] IN BLOOD BY AUTOMATED COUNT: 12.9 % (ref 11–15)
ERYTHROCYTE [DISTWIDTH] IN BLOOD BY AUTOMATED COUNT: 12.9 % (ref 11–15)
GLUCOSE BLD-MCNC: 103 MG/DL (ref 70–99)
HCT VFR BLD AUTO: 32.7 %
HCT VFR BLD AUTO: 32.7 %
HGB BLD-MCNC: 10.8 G/DL
HGB BLD-MCNC: 10.8 G/DL
IMM GRANULOCYTES # BLD AUTO: 0.06 X10(3) UL (ref 0–1)
IMM GRANULOCYTES NFR BLD: 0.7 %
LYMPHOCYTES # BLD AUTO: 1.38 X10(3) UL (ref 1–4)
LYMPHOCYTES NFR BLD AUTO: 15 %
MAGNESIUM SERPL-MCNC: 2 MG/DL (ref 1.6–2.6)
MCH RBC QN AUTO: 30 PG (ref 26–34)
MCH RBC QN AUTO: 30 PG (ref 26–34)
MCHC RBC AUTO-ENTMCNC: 33 G/DL (ref 31–37)
MCHC RBC AUTO-ENTMCNC: 33 G/DL (ref 31–37)
MCV RBC AUTO: 90.8 FL
MCV RBC AUTO: 90.8 FL
MONOCYTES # BLD AUTO: 0.73 X10(3) UL (ref 0.1–1)
MONOCYTES NFR BLD AUTO: 7.9 %
NEUTROPHILS # BLD AUTO: 6.48 X10 (3) UL (ref 1.5–7.7)
NEUTROPHILS # BLD AUTO: 6.48 X10(3) UL (ref 1.5–7.7)
NEUTROPHILS NFR BLD AUTO: 70.4 %
OSMOLALITY SERPL CALC.SUM OF ELEC: 298 MOSM/KG (ref 275–295)
PLATELET # BLD AUTO: 122 10(3)UL (ref 150–450)
PLATELET # BLD AUTO: 122 10(3)UL (ref 150–450)
POTASSIUM SERPL-SCNC: 3.9 MMOL/L (ref 3.5–5.1)
RBC # BLD AUTO: 3.6 X10(6)UL
RBC # BLD AUTO: 3.6 X10(6)UL
SODIUM SERPL-SCNC: 143 MMOL/L (ref 136–145)
TSI SER-ACNC: 1.68 MIU/ML (ref 0.55–4.78)
WBC # BLD AUTO: 9.2 X10(3) UL (ref 4–11)
WBC # BLD AUTO: 9.2 X10(3) UL (ref 4–11)

## 2023-11-30 PROCEDURE — 99239 HOSP IP/OBS DSCHRG MGMT >30: CPT | Performed by: HOSPITALIST

## 2023-11-30 RX ORDER — METOPROLOL SUCCINATE 50 MG/1
50 TABLET, EXTENDED RELEASE ORAL
Status: DISCONTINUED | OUTPATIENT
Start: 2023-11-30 | End: 2023-11-30

## 2023-11-30 RX ORDER — FUROSEMIDE 10 MG/ML
20 INJECTION INTRAMUSCULAR; INTRAVENOUS ONCE
Status: COMPLETED | OUTPATIENT
Start: 2023-11-30 | End: 2023-11-30

## 2023-11-30 RX ORDER — METOPROLOL SUCCINATE 50 MG/1
50 TABLET, EXTENDED RELEASE ORAL
Qty: 60 TABLET | Refills: 3 | Status: SHIPPED | OUTPATIENT
Start: 2023-11-30

## 2023-11-30 NOTE — PHYSICAL THERAPY NOTE
PHYSICAL THERAPY KNEE TREATMENT NOTE - INPATIENT     Room Number: 336/336-A       Presenting Problem: S/P R TKA 11/27/23  Co-Morbidities : OA, HTN, Afib, S/P L TKA 11/2022    Problem List  Principal Problem:    Primary osteoarthritis of one knee, right  Active Problems:    Paroxysmal atrial fibrillation (HCC)    Essential hypertension    Hyperlipidemia    BPH (benign prostatic hyperplasia)      PHYSICAL THERAPY ASSESSMENT   RN approved participation with physical therapy. Pt was received sitting at EOB and agreeable to activity. educated on role of PT and goals for this session. Pt verbalized understanding. Pt with c/o moderate R knee pain this date which was unrated. Transfers: SBA for STS with RW   Gait: 80 ft with RW and SBA. Slow, shuffled gait, decreased weight bearing on RLE and R flexed knee. VC given for upright posture and to extend R knee, unable to achieve this date. No LOB. Required 1 standing rest break due to pain. SPO2 prior to activity, at rest, on room air= 93%. SPO2 with activity on room air= 88%. Recovered to >90% within 1 minute of rest.     Encouraged pt to mobilize/ambulate every hour when at home. Pt was left sitting in chair with needs within reach, handoff to RN complete. The patient's Approx Degree of Impairment: 46.58% has been calculated based on documentation in the [facility] '6 clicks' Inpatient Basic Mobility Short Form. Research supports that patients with this level of impairment may benefit from [facility] with [facility].    DISCHARGE RECOMMENDATIONS  PT Discharge Recommendations: Outpatient PT    PLAN  PT Treatment Plan: Bed mobility; Patient education;Gait training;Family education;Strengthening;Range of motion  Frequency (Obs): Daily    SUBJECTIVE  Agreeable to activity. OBJECTIVE  Precautions: None    WEIGHT BEARING STATUS  R Lower Extremity: Weight Bearing as Tolerated    PAIN ASSESSMENT   Rating:  (did not rate)  Location: R knee  Management Techniques: Activity promotion; Body mechanics;Repositioning    BALANCE  Static Sitting: Good  Dynamic Sitting: Fair +  Static Standing: Fair  Dynamic Standing: Fair -    O2 WALK  Oxygen Therapy  SPO2% on Room Air at Rest: 93  SPO2% Ambulation on Room Air: 88    AM-PAC '6-Clicks' INPATIENT SHORT FORM - BASIC MOBILITY  How much difficulty does the patient currently have. .. Patient Difficulty: Turning over in bed (including adjusting bedclothes, sheets and blankets)?: A Little   Patient Difficulty: Sitting down on and standing up from a chair with arms (e.g., wheelchair, bedside commode, etc.): A Little   Patient Difficulty: Moving from lying on back to sitting on the side of the bed?: A Little   How much help from another person does the patient currently need. .. Help from Another: Moving to and from a bed to a chair (including a wheelchair)?: A Little   Help from Another: Need to walk in hospital room?: A Little   Help from Another: Climbing 3-5 steps with a railing?: A Little     AM-PAC Score:  Raw Score: 18   Approx Degree of Impairment: 46.58%   Standardized Score (AM-PAC Scale): 43.63   CMS Modifier (G-Code): CK    FUNCTIONAL ABILITY STATUS  Functional Mobility/Gait Assessment  Gait Assistance: Contact guard assist  Distance (ft): 80  Assistive Device: Rolling walker  Pattern: Shuffle;R Decreased stance time (heavy reliance of BUEs on RW, flexed posture, R decreased weight bearing)  Stairs: Stairs  How Many Stairs: 8  Device: 1 Rail  Assist: Supervision  Pattern: Ascend and Descend  Ascend and Descend : Step to (cues for sequencing)    Patient End of Session: Up in chair;Needs met;Call light within reach;RN aware of session/findings; All patient questions and concerns addressed    CURRENT GOALS  Goals to be met by: 11/30/23  Patient Goal Patient's self-stated goal is: return home safely   Goal #1 Patient is able to demonstrate supine - sit EOB @ level: modified independent      Goal #1   Current Status NT   Goal #2 Patient is able to demonstrate transfers Sit to/from Stand at assistance level: modified independent      Goal #2  Current Status ISBA with RW    Goal #3 Patient is able to ambulate 300 feet with assistive device at assistance level: modified independent    Goal #3   Current Status 80 ft with RW and SBA   Goal #4 Patient will negotiate 10 stairs/one curb w/ assistive device and supervision   Goal #4   Current Status NT   Goal #5  AROM 0 degrees extension to 95 degrees flexion     Goal #5   Current Status 13-88   Goal #6 Patient independently performs home exercise program for ROM/strengthening per the instructions provided in preparation for discharge.    Goal #6  Current Status In progress      Gait Training: 15 minutes

## 2023-11-30 NOTE — CM/SW NOTE
For RN to copy into progress note:  Documentation for O2 sats: (RN to add to progress note)  Patient's O2 sat on room air is ____% at rest. Pt's O2 sat on room air is ____% when ambulating, and ____% on ____ liter while ambulating. (Reminder: The \"at rest\" and \"while ambulating\" are required statements. If patient is non ambulatory you can use \"with exertion\" such as during a transfer to assess their O2 level)      For MD to copy into progress note:  I had a face to face visit with this patient and I evaluated the patient's O2 saturations. The patient is mobile in their home and is in need of a portable oxygen tank. The home oxygen will improve the patient's condition.

## 2023-11-30 NOTE — PROGRESS NOTES
Patient's O2 sat on room air is 92% at rest. Pt's O2 sat on room air is 88% when ambulating, and 92% on 2 liter while ambulating.

## 2023-11-30 NOTE — CM/SW NOTE
11/30/23 1500   Discharge disposition   Expected discharge disposition Home-Health   Post Acute Care Provider Other  ([address])   DME/Infusion Providers [facility]   Discharge transportation Private car     The patient received a MDO for discharge. The patient is reserved with [address]. The patient is also reserved with HME for home O2. The patient will be transported home via private car. The patient has no questions or concerns at this time. SW/CM to remain available for support and/or discharge planning.      Sincere Torres MSW, [facility]  Discharge Planner [id number]

## 2023-11-30 NOTE — DISCHARGE PLANNING
Patient was provided with discharge instructions, education, and follow up information. Prescriptions were already sent electronically to patient's pharmacy. Patient verbalizes understanding of follow up information, specifically orthopedic MD and PCP, and discussed safety of home oxygen use. Patient has no questions after reviewing all instructions and will be going Home.      Ludwin Lamar RN, Discharge Leader [id number]

## 2023-11-30 NOTE — DISCHARGE SUMMARY
[facility]    Discharge Summary    Vincenzo Mike. Patient Status:  Inpatient    1955 MRN [id number]   Location [facility] 3W/SW Attending Anny Mackay MD   Hosp Day # 1 PCP No primary care provider on file. Date of Admission: 2023 Disposition: [facility]     Date of Discharge: 23      Admitting Diagnosis: Right knee degenerative joint disease    Hospital Discharge Diagnoses:  rt. Knee djd    Lace+ Score: 26  59-90 High Risk  29-58 Medium Risk  0-28   Low Risk. TCM Follow-Up Recommendation:  LACE < 29: Low Risk of readmission after discharge from the hospital. No TCM follow-up needed. Problem List:   Patient Active Problem List   Diagnosis    Primary osteoarthritis of left knee    Paroxysmal atrial fibrillation (HCC)    Essential hypertension    Hyperlipidemia    BPH (benign prostatic hyperplasia)    Preoperative testing    Primary osteoarthritis of one knee, right       Reason for Admission:   Right knee primary osteoarthritis  Acute post op hypoxemic respiratory failure  Post op urinary retention  Afib RVR with hx of Paroxysmal atrial fibrillation. Essential hypertension. Hyperlipidemia. Benign prostatic hypertrophy. Urinary retention    Physical Exam:   See PN      History of Present Illness:   Per Ortho   Patient is a 79year old male with a history of right knee DJD. Cleared by their PMD for a R TKA. There are no contraindications to proceed with surgery today. Informed consent has been obtained and all RBA have been discussed. Hospital Course:   Right knee primary osteoarthritis  - status post right total knee arthroplasty, spinal block.    - pain with oral meds as tolerated  - Eliquis for dvt prophy  - cont pt/ot     Acute post op hypoxemic respiratory failure  - desaturation with ambulation likely due to atelectasis and postoperative volume overload  - encouraged IS  -Give additional dose of IV Lasix  -Patient's oxygen saturations are 92% at rest with ambulation dropped to 88%, requires 2 L to stay over 90% with ambulation.  -Will set up home oxygen  -I had a face to face visit with this patient and I evaluated the patient's O2 saturations. The patient is mobile in their home and is in need of a portable oxygen tank. The home oxygen will improve the patient's condition. -follow-up with PCP re: hypoxia     Post op urinary retention  - pt has a hx of BPH  -Resolved     Afib RVR with hx of Paroxysmal atrial fibrillation. - started IV cardizem  - seen by cards  - started oral metoprolol  - cont eliquis  - wean IV cardizem as able     Essential hypertension.  -  Continue home medications. Hyperlipidemia. Continue home medications. Benign prostatic hypertrophy. Urinary retention- management as above  Continue home medications. Consultations:   orthopedics    Procedures: s/p rt. Knee tka    Complications: n/a    Discharge Condition: Good    Discharge Medications:      Medication List        START taking these medications      HYDROcodone-acetaminophen  MG Tabs  Commonly known as: Norco  Take 1 tablet by mouth every 4 to 6 hours as needed. metoprolol succinate ER 50 MG Tb24  Commonly known as: Toprol XL  Take 1 tablet (50 mg total) by mouth 2x Daily(Beta Blocker). Sennosides 17.2 MG Tabs  Take 1 tablet (17.2 mg total) by mouth nightly.             CONTINUE taking these medications      acetaminophen 500 MG Tabs  Commonly known as: Tylenol Extra Strength     apixaban 5 MG Tabs  Commonly known as: Eliquis     ascorbic acid 1000 MG Tabs  Commonly known as: VITAMIN C     cloNIDine 0.1 MG Tabs  Commonly known as: Catapres     doxazosin 8 MG Tabs  Commonly known as: Cardura     ergocalciferol 1.25 MG (19136 UT) Caps  Commonly known as: ERGOCALCIFEROL     flecainide 100 MG Tabs  Commonly known as: Tambocor     furosemide 40 MG Tabs  Commonly known as: Lasix     simvastatin 10 MG Tabs  Commonly known as: Zocor     traMADol 50 MG Tabs  Commonly known as: Ultram            STOP taking these medications      metoprolol tartrate 10 mg/mL Susp  Commonly known as: Lopressor     potassium chloride 40 meq/30 ml (10%) Soln  Commonly known as: K-Sol               Where to Get Your Medications        These medications were sent to [address] [place] [address], [address] [phone], [phone]  [address] [place] [address], [address] [place] [address]      Phone: [phone]   HYDROcodone-acetaminophen  MG Tabs  metoprolol succinate ER 50 MG Tb24  Sennosides 17.2 MG Tabs         Follow up Visits: Follow-up with PCP in 1 week    Follow up Labs: n/a     Other Discharge Instructions:  Follow-up with orthopedic surgery as instructed    Millicent Fothergill, MD  11/30/2023  2:00 PM    > 35 min

## (undated) DEVICE — TOTAL KNEE: Brand: MEDLINE INDUSTRIES, INC.

## (undated) DEVICE — COTTON UNDERCAST PADDING,REGULAR FINISH: Brand: WEBRIL

## (undated) DEVICE — WRAP COOLING KNEE W/ICE PILLOW

## (undated) DEVICE — Device: Brand: JELCO

## (undated) DEVICE — SKIN PREP TRAY 4 COMPARTM TRAY: Brand: MEDLINE INDUSTRIES, INC.

## (undated) DEVICE — NON-ADHERENT PADS PREPACK: Brand: TELFA

## (undated) DEVICE — SUTURE MCRYL SZ 2-0 L36IN ABSRB UD L36MM CT-1

## (undated) DEVICE — PAD N ADH W3XL4IN POLY COT SFT PERF FLM ABSRB

## (undated) DEVICE — Device: Brand: STABLECUT®

## (undated) DEVICE — TRAY SURESTEP 16 BARDEX DRAIN

## (undated) DEVICE — 2T11 #2 PDO 36 X 36: Brand: 2T11 #2 PDO 36 X 36

## (undated) DEVICE — GAMMEX® NON-LATEX PI ORTHO SIZE 8.5, STERILE POLYISOPRENE POWDER-FREE SURGICAL GLOVE: Brand: GAMMEX

## (undated) DEVICE — SOLUTION PREP 26ML 0.7% POVACRYLEX 74% ISO

## (undated) DEVICE — SHEET,DRAPE,70X100,STERILE: Brand: MEDLINE

## (undated) DEVICE — ADH DRMBND PRINEO SKN CLOS TOP

## (undated) DEVICE — 20 ML SYRINGE LUER-LOCK TIP: Brand: MONOJECT

## (undated) DEVICE — Device

## (undated) DEVICE — SYRINGE ONLY,20ML LUER LOCK: Brand: MEDLINE INDUSTRIES, INC.

## (undated) DEVICE — PAD,ABDOMINAL,8"X7.5",STERILE,LF,1/PK: Brand: MEDLINE

## (undated) DEVICE — GAMMEX® PI HYBRID SIZE 8.5, STERILE POWDER-FREE SURGICAL GLOVE, POLYISOPRENE AND NEOPRENE BLEND: Brand: GAMMEX

## (undated) DEVICE — DRAPE SHEET LARGE 76X55

## (undated) DEVICE — SOLUTION IRRIG 1000ML 0.9% NACL USP BTL

## (undated) DEVICE — BNDG COHESIVE W4INXL5YD TAN E

## (undated) DEVICE — BOWL CEMENT MIX QUICK-VAC

## (undated) DEVICE — ENCORE® LATEX MICRO SIZE 8.5, STERILE LATEX POWDER-FREE SURGICAL GLOVE: Brand: ENCORE

## (undated) DEVICE — IMPLANTABLE DEVICE
Type: IMPLANTABLE DEVICE | Site: KNEE | Status: NON-FUNCTIONAL
Removed: 2023-11-27

## (undated) DEVICE — DRAPE SHEET LG

## (undated) DEVICE — SOLUTION SURG DURAPREP 26ML

## (undated) DEVICE — BANDAGE COMP PREMPRO 5YDX4IN

## (undated) DEVICE — BOWL BNE CEM MIX DISP QUIK-VAC

## (undated) DEVICE — HOOD: Brand: FLYTE

## (undated) DEVICE — BANDAGE COHESIVE W4INXL5YD TAN E POR SLF ADH

## (undated) DEVICE — SOLUTION  .9 1000ML BTL

## (undated) DEVICE — 48MM SQUARE HEAD PIN

## (undated) DEVICE — DISPOSABLE TOURNIQUET CUFF SINGLE BLADDER, DUAL PORT AND QUICK CONNECT CONNECTOR: Brand: COLOR CUFF

## (undated) DEVICE — SUT MONOCRYL 2-0 CT-1 Y945H

## (undated) DEVICE — PIN 48X3MM

## (undated) DEVICE — SOLUTION IRRIG 3000ML 0.9% NACL FLX CONT

## (undated) DEVICE — SOLUTION  .9 3000ML

## (undated) DEVICE — SPNG GZ W4XL4IN COT 12 PLY TYP

## (undated) DEVICE — SPONGE GZ 4XL4IN 100% COT 12 PLY TYP VII WVN

## (undated) DEVICE — PEN: MARKING STD PT 100/CS: Brand: MEDICAL ACTION INDUSTRIES

## (undated) DEVICE — GAMMEX® PI HYBRID SIZE 7, STERILE POWDER-FREE SURGICAL GLOVE, POLYISOPRENE AND NEOPRENE BLEND: Brand: GAMMEX

## (undated) DEVICE — TRAY CATH 16FR F INCLUDE BARDX IC COMPLT CARE